# Patient Record
Sex: FEMALE | Race: WHITE | Employment: OTHER | URBAN - METROPOLITAN AREA
[De-identification: names, ages, dates, MRNs, and addresses within clinical notes are randomized per-mention and may not be internally consistent; named-entity substitution may affect disease eponyms.]

---

## 2022-07-19 RX ORDER — SODIUM CHLORIDE, SODIUM LACTATE, POTASSIUM CHLORIDE, CALCIUM CHLORIDE 600; 310; 30; 20 MG/100ML; MG/100ML; MG/100ML; MG/100ML
1000 INJECTION, SOLUTION INTRAVENOUS CONTINUOUS
Status: CANCELLED | OUTPATIENT
Start: 2022-07-19

## 2022-07-19 NOTE — DISCHARGE INSTRUCTIONS
Preoperative Instructions:    Stop eating solid foods at midnight the night prior to surgery. Stop drinking clear liquids at midnight the night prior to surgery. Follow bowel prep instructions as directed by surgeon. Arrive at the surgery center (Entrance B) by 6:00 on 8/3/2022  (or as directed by your surgeon's office). If you have been given a blood band, you must bring it with you the day of surgery. Please stop any blood thinning medications as directed by your surgeon or prescribing physician. Failure to stop certain medications may interfere with your scheduled surgery. These may include:  Aspirin, Warfarin (Coumadin), Clopidogrel (Plavix), Ibuprofen (Motrin, Advil), Naproxen (Aleve), Meloxicam (Mobic), Celecoxib (Celebrex), Eliquis, Pradaxa, Xarelto, Effient, Fish Oil, Herbal supplements. You may continue the rest of your medications through the night before surgery unless instructed otherwise. Please take only the following medication(s) the day of surgery with a small sip of water:  Lisinopril, coreg/carvedilol, norvasc/amlodipine, protonix    Please use and bring inhalers the day of surgery. Please bring CPAP the day of surgery. ____________________________  ____________________________  Signature (Patient)           Signature/date(Provider)      REMINDERS:  ** If you are going home the day of your procedure, you will need a friend or family member to drive you home after your procedure. Your  must be 25years of age or older and able to sign off on your discharge instructions. Taxi cabs or any form of public transportation is not acceptable. ** It is preferable that the friend or family member stay at the hospital throughout your procedure. ** If you are going home the same day as your procedure, someone must remain with you for the first 24 hours after your surgery if you receive anesthesia or sedation.   If you do not have someone to stay with you, your procedure may be cancelled. **  Please do not wear any jewelry or body piercings the day of surgery. PREPARING FOR YOUR SURGERY:     Before surgery, you can play an important role in your own health. Because skin is not sterile, we need to be sure that your skin is as free of germs as possible before surgery by carefully washing before surgery. Preparing or prepping skin before surgery can reduce the risk of a surgical site infection.   Do not shave the area of your body where your surgery will be performed unless you received specific permission from your physician. You will need to shower at home the night before surgery and the morning of surgery with a special soap called chlorhexidine gluconate (CHG*). *Not to be used by people allergic to Chlorhexidine Gluconate (CHG). Following these instructions will help you be sure that your skin is clean before surgery. Instructions on cleaning your skin before surgery: The night before your surgery: You will need to shower with warm water (not hot) and the CHG soap. Use a clean wash cloth and a clean towel. Have clean clothes available to put on after the shower. First wash your hair with regular shampoo. Rinse your hair and body thoroughly to remove the shampoo. Wash your face with your regular soap or water only. Thoroughly rinse your body with warm water from the neck down. Turn water off to prevent rinsing the soap off too soon. With a clean wet washcloth and half of the CHG soap in the bottle, lather your entire body from the neck down. Do not use CHG soap near your eyes or ears to avoid injury to those areas. Wash thoroughly, paying special attention to the area where your surgery will be performed. Wash your body gently for five (5) minutes. Avoid scrubbing your skin too hard. Turn the water back on and rinse your body thoroughly. Pat yourself dry with a clean, soft towel.  Do not apply lotion, cream or powder. Dress with clean freshly washed clothes. The morning of surgery:    Repeat shower following steps above  - using remaining half of CHG soap in bottle. If you have any questions, call the Pre-Admission Testing Unit at [phone]. Day of Surgery/Procedure    As a patient at [facility] you can expect quality medical and nursing care that is centered on your individual needs. Our goal is to make your surgical experience as comfortable as possible  . Directions to the [address] [place] [address] is located at [address] [facility], [address]. Please pull into the Emergency [facility] parking lot (Entrance B) and park in that lot. We also have additional parking across the street. You will enter the facility following the [address] sign. Please stop at the reception desk where you will be checked in by the staff. If you have any questions please call [phone]. Transportation after your procedure. You will need a friend or family member to drive you home after your procedure. Your  must be 25years of age or older and able to sign off on your discharge instructions. Taxi cabs or any form of public transportation is not acceptable. It is preferable that the friend or family member stay at the hospital throughout your procedure. Someone must remain at home with you for the first 24 hours after your surgery if you receive anesthesia or sedation. If you do not have someone to stay with you, your procedure may be cancelled. Patient Instructions    If you are having any type of anesthesia you are to have nothing to eat or drink after midnight the night before your surgery. This includes gum, mints, water or smoking or chewing tobacco.  The only exception to this is a small sip of water to take with any morning dose of heart, blood pressure, or seizure medications.     Bring a list of all medications you take, along with the dose of the medications and how often you take it. If more convenient bring the pharmacy bottles in a zip lock bag. Please shower the night before and the morning of surgery with an antibacterial soap. Please use the wipes given to you the night before your surgery after your shower. Unless otherwise told by your physician, please do not shave legs or any part of your body below your neck the night before or day of your surgery. You may shave your face or neck. Brush your teeth but do not swallow water. Bring your inhaler if you are currently using one. Bring your eyeglasses and case with you. No contacts are to be worn the day of surgery. You also may bring your hearing aids. Bring your blood band if one has been given to you. Please do not close the clasp. If you are on C-PAP or Bi-PAP at home and plan on staying in the hospital overnight for your surgery please bring the machine with you. Do not wear any jewelry or body piercings day of surgery. Also, NO lotion, perfume or deodorant to be used the day of surgery. Do not bring any valuables, such as jewelry, cash or credit cards. If you are staying overnight with us, please bring a SMALL bag of personal items. We cannot accommodate large items, like suitcases. Please wear loose, comfortable clothing. If you are potentially going to have a cast or brace bring clothing that will fit over them.                                                                                                                           In case of illness - If you have cold or flu like symptoms (high fever, runny nose, sore throat, cough, etc.) rash, nausea, vomiting, loose stools, and/or recent contact with someone who has a contagious disease (chicken pox, measles, etc.) Please call your doctor before coming to the hospital.      If your child is having surgery please make arrangements for any other children to be cared

## 2022-07-20 ENCOUNTER — HOSPITAL ENCOUNTER (OUTPATIENT)
Dept: PREADMISSION TESTING | Age: 77
Discharge: HOME | End: 2022-07-24
Payer: MEDICARE

## 2022-07-20 VITALS
WEIGHT: 147 LBS | TEMPERATURE: 97.6 F | OXYGEN SATURATION: 98 % | HEART RATE: 70 BPM | RESPIRATION RATE: 18 BRPM | DIASTOLIC BLOOD PRESSURE: 83 MMHG | HEIGHT: 64 IN | SYSTOLIC BLOOD PRESSURE: 160 MMHG | BODY MASS INDEX: 25.1 KG/M2

## 2022-07-20 LAB
ABO/RH: NORMAL
ANION GAP SERPL CALCULATED.3IONS-SCNC: 12 MMOL/L (ref 9–17)
ANTIBODY SCREEN: NEGATIVE
ARM BAND NUMBER: NORMAL
BILIRUBIN URINE: NEGATIVE
BUN BLDV-MCNC: 17 MG/DL (ref 8–23)
CHLORIDE BLD-SCNC: 104 MMOL/L (ref 98–107)
CO2: 26 MMOL/L (ref 20–31)
COLOR: YELLOW
COMMENT UA: ABNORMAL
CREAT SERPL-MCNC: 0.74 MG/DL (ref 0.5–0.9)
EXPIRATION DATE: NORMAL
GFR AFRICAN AMERICAN: >60 ML/MIN
GFR NON-AFRICAN AMERICAN: >60 ML/MIN
GFR SERPL CREATININE-BSD FRML MDRD: NORMAL ML/MIN/{1.73_M2}
GLUCOSE BLD-MCNC: 118 MG/DL (ref 70–99)
GLUCOSE URINE: NEGATIVE
HCT VFR BLD CALC: 42.1 % (ref 36.3–47.1)
HEMOGLOBIN: 13.9 G/DL (ref 11.9–15.1)
KETONES, URINE: NEGATIVE
LEUKOCYTE ESTERASE, URINE: NEGATIVE
MCH RBC QN AUTO: 29.8 PG (ref 25.2–33.5)
MCHC RBC AUTO-ENTMCNC: 33 G/DL (ref 28.4–34.8)
MCV RBC AUTO: 90.1 FL (ref 82.6–102.9)
NITRITE, URINE: NEGATIVE
NRBC AUTOMATED: 0 PER 100 WBC
PDW BLD-RTO: 13 % (ref 11.8–14.4)
PH UA: 7.5 (ref 5–8)
PLATELET # BLD: 320 K/UL (ref 138–453)
PMV BLD AUTO: 10.2 FL (ref 8.1–13.5)
POTASSIUM SERPL-SCNC: 4 MMOL/L (ref 3.7–5.3)
PROTEIN UA: NEGATIVE
RBC # BLD: 4.67 M/UL (ref 3.95–5.11)
SODIUM BLD-SCNC: 142 MMOL/L (ref 135–144)
SPECIFIC GRAVITY UA: 1 (ref 1–1.03)
TURBIDITY: CLEAR
URINE HGB: NEGATIVE
UROBILINOGEN, URINE: NORMAL
WBC # BLD: 9.1 K/UL (ref 3.5–11.3)

## 2022-07-20 PROCEDURE — 86900 BLOOD TYPING SEROLOGIC ABO: CPT

## 2022-07-20 PROCEDURE — 86901 BLOOD TYPING SEROLOGIC RH(D): CPT

## 2022-07-20 PROCEDURE — 86850 RBC ANTIBODY SCREEN: CPT

## 2022-07-20 PROCEDURE — 84520 ASSAY OF UREA NITROGEN: CPT

## 2022-07-20 PROCEDURE — 82565 ASSAY OF CREATININE: CPT

## 2022-07-20 PROCEDURE — 93005 ELECTROCARDIOGRAM TRACING: CPT | Performed by: ANESTHESIOLOGY

## 2022-07-20 PROCEDURE — 36415 COLL VENOUS BLD VENIPUNCTURE: CPT

## 2022-07-20 PROCEDURE — 81003 URINALYSIS AUTO W/O SCOPE: CPT

## 2022-07-20 PROCEDURE — 82947 ASSAY GLUCOSE BLOOD QUANT: CPT

## 2022-07-20 PROCEDURE — 80051 ELECTROLYTE PANEL: CPT

## 2022-07-20 PROCEDURE — 87086 URINE CULTURE/COLONY COUNT: CPT

## 2022-07-20 PROCEDURE — 85027 COMPLETE CBC AUTOMATED: CPT

## 2022-07-20 RX ORDER — PANTOPRAZOLE SODIUM 40 MG/1
40 TABLET, DELAYED RELEASE ORAL 2 TIMES DAILY
COMMUNITY
Start: 2021-12-13

## 2022-07-20 RX ORDER — HYDRALAZINE HYDROCHLORIDE 10 MG/1
10 TABLET, FILM COATED ORAL PRN
COMMUNITY
Start: 2022-03-15

## 2022-07-20 RX ORDER — AMLODIPINE BESYLATE 10 MG/1
10 TABLET ORAL DAILY
COMMUNITY

## 2022-07-20 RX ORDER — SIMVASTATIN 20 MG
20 TABLET ORAL NIGHTLY
COMMUNITY

## 2022-07-20 RX ORDER — L. ACIDOPHILUS/PECTIN, CITRUS 25MM-100MG
1 TABLET ORAL
COMMUNITY

## 2022-07-20 RX ORDER — LISINOPRIL 40 MG/1
40 TABLET ORAL
Status: ON HOLD | COMMUNITY
Start: 2022-03-15 | End: 2022-08-04 | Stop reason: HOSPADM

## 2022-07-20 RX ORDER — CARVEDILOL 12.5 MG/1
12.5 TABLET ORAL 2 TIMES DAILY WITH MEALS
COMMUNITY

## 2022-07-20 RX ORDER — BUTALBITAL, ACETAMINOPHEN AND CAFFEINE 300; 40; 50 MG/1; MG/1; MG/1
1 CAPSULE ORAL PRN
COMMUNITY
Start: 2022-03-15

## 2022-07-20 RX ORDER — MAGNESIUM OXIDE 400 MG/1
400 TABLET ORAL DAILY
COMMUNITY
Start: 2022-03-15

## 2022-07-20 RX ORDER — HEPARIN SODIUM 5000 [USP'U]/ML
5000 INJECTION, SOLUTION INTRAVENOUS; SUBCUTANEOUS ONCE
Status: CANCELLED | OUTPATIENT
Start: 2022-07-20 | End: 2022-07-20

## 2022-07-20 NOTE — PROGRESS NOTES
Anesthesia Focused Assessment    Has patient ever tested positive for COVID? No    STOP-BANG Sleep Apnea Questionnaire    SNORE loudly (heard through closed doors)? No  TIRED, fatigued, sleepy during daytime? No  OBSERVED stopping breathing during sleep? No  High blood PRESSURE being treated? Yes    BMI over 35? No  AGE over 48? Yes  NECK circumference over 16\"? No  GENDER (male)? No             Total 2  High risk 5-8  Intermediate risk 3-4  Low risk 0-2    Obstructive Sleep Apnea: denies  If YES, machine used: no     Type 1 DM:   no  T2DM:  no    Coronary Artery Disease:  no  Hypertension:  yes    Active smoker:  no  Drinks Alcohol:  no    Dentition: molar crowns    Defib / AICD / Pacemaker: no      Renal Failure/dialysis:  no    Patient was evaluated in PAT & anesthesia guidelines were applied. NPO guidelines, medication instructions and scheduled arrival time were reviewed with patient. I advised patient to please contact the surgeon's office, ahead of time if possible, if any new signs or symptoms of illness, infection, rash, etc    Hx of anesthesia complications:  no  Family hx of anesthesia complications:  dad had pneumonia complication after surgery in 1970s. Anesthesia contacted:   no  Medical or cardiac clearance ordered: patient says that PCP is aware of surgery, clearance pending. Will send results to pcp and request clearance for chart.     Aisha Cooper PA-C  7/20/22  11:02 AM

## 2022-07-20 NOTE — H&P
History and Physical    Pt Name: Dellia Severance  MRN: [id number]  YOB: 1945  Date of evaluation: 7/20/2022    SUBJECTIVE:   History of Chief Complaint:    Patient presents for PAT appointment. She reports renal mass. Patient says that she had a CT scan of the chest and was noted to have abnormality on imaging of the top of the kidneys. She says that she then underwent further imaging as a result, has has biopsy x 2. She now has been scheduled for left partial nephrectomy. Past Medical History    has a past medical history of Anxiety, Arthritis, Elevated blood sugar, GERD (gastroesophageal reflux disease), Hyperlipidemia, Hypertension, Migraine, Numbness, RBBB (right bundle branch block), Rectal bleeding, Renal cancer ([facility] [address] and Under care of service provider. Past Surgical History   has a past surgical history that includes Tonsillectomy and adenoidectomy (1960); Dilation and curettage of uterus (1975); Appendectomy (1989); Colonoscopy (1995); Hysterectomy (1997); back surgery (2008); fracture surgery (Left, 2008); lipoma resection (2015); and eye surgery (2016). Medications  Prior to Admission medications    Medication Sig Start Date End Date Taking? Authorizing Provider   lisinopril (PRINIVIL;ZESTRIL) 40 MG tablet Take 40 mg by mouth 3/15/22  Yes Historical Provider, MD   hydrALAZINE (APRESOLINE) 10 MG tablet Take 10 mg by mouth as needed 3/15/22  Yes Historical Provider, MD   butalbital-APAP-caffeine -40 MG CAPS per capsule Take 1 capsule by mouth as needed 3/15/22  Yes Historical Provider, MD   pantoprazole (PROTONIX) 40 MG tablet Take 40 mg by mouth in the morning and 40 mg before bedtime. 12/13/21  Yes Historical Provider, MD   magnesium oxide (MAG-OX) 400 MG tablet Take 400 mg by mouth in the morning.  3/15/22  Yes Historical Provider, MD   simvastatin (ZOCOR) 20 MG tablet Take 20 mg by mouth nightly 0.5 tab   Yes Historical Provider, MD   Multiple Vitamin (MVI, OPURITY BAND OPTIMIZED, CHEW TAB) Take 1 tablet by mouth in the morning. Yes Historical Provider, MD   Calcium Polycarbophil (FIBER-CAPS PO) Take 2 capsules by mouth daily   Yes Historical Provider, MD   carvedilol (COREG) 12.5 MG tablet Take 12.5 mg by mouth in the morning and 12.5 mg in the evening. Take with meals. Historical Provider, MD   amLODIPine (NORVASC) 10 MG tablet Take 10 mg by mouth in the morning. Historical Provider, MD   Cholecalciferol 50 MCG (2000 UT) TABS Take 2,000 Units by mouth in the morning. Historical Provider, MD   Lactobacillus Acid-Pectin (ACIDOPHILUS/CITRUS PECTIN) TABS Take 1 tablet by mouth daily (with breakfast)    Historical Provider, MD     Allergies  is allergic to latex, onion, adhesive tape, codeine, epoetin beta, erythromycin, oxycodone-acetaminophen, and propoxyphene. Family History  family history includes Heart Disease in her brother and brother; Lung Disease in her brother and brother; Other in her brother and sister; Pneumonia in her father and mother. Social History   reports that she has never smoked. She has never been exposed to tobacco smoke. She has never used smokeless tobacco.   reports that she does not currently use alcohol. reports no history of drug use.   Marital Status   Occupation none    Review of Systems:  CONSTITUTIONAL:   negative for fevers, chills, fatigue and malaise    EYES:   negative for double vision, blurred vision and photophobia    HEENT:   negative for tinnitus, epistaxis and sore throat     RESPIRATORY:   negative for cough, shortness of breath, wheezing     CARDIOVASCULAR:   negative for chest pain, palpitations, syncope, edema     GASTROINTESTINAL:   negative for nausea, vomiting     GENITOURINARY:   See HPI   MUSCULOSKELETAL:   negative for neck or back pain     NEUROLOGICAL:   Negative for weakness and tingling  negative for headaches and dizziness     PSYCHIATRIC:   negative for anxiety         OBJECTIVE:   VITALS:  height is 5' 4\" (1.626 m) and weight is 147 lb (66.7 kg). Her temporal temperature is 97.6 °F (36.4 °C). Her blood pressure is 160/83 (abnormal) and her pulse is 70. Her respiration is 18 and oxygen saturation is 98%. CONSTITUTIONAL:alert & cooperative, no acute distress. Pleasant and talkative. SKIN:  Warm and dry, no rashes on exposed areas of skin   HEAD:  Normocephalic, atraumatic   EYES: EOMs intact. EARS:  Hearing grossly WNL. NOSE:  Nares patent. No rhinorrhea   MOUTH/THROAT:  benign  NECK:good ROM   LUNGS: Clear to auscultation bilaterally, no wheezes. CARDIOVASCULAR: Heart sounds are normal.  Regular rate and rhythm without murmur. ABDOMEN: soft, non tender, non distended. EXTREMITIES: positive edema bilateral lower extremities. Testing:   EK22  Labs pending: drawn 2022     IMPRESSIONS:   Renal mass   has a past medical history of Anxiety, Arthritis, Elevated blood sugar, GERD (gastroesophageal reflux disease), Hyperlipidemia, Hypertension, Migraine, Numbness, RBBB (right bundle branch block), Rectal bleeding, Renal cancer ([facility] [address] and Under care of service provider (2022).    PLANS:   Partial nephrectomy    ALLEGRA Will PA-C  Electronically signed 2022 at 12:59 PM

## 2022-07-21 LAB
CULTURE: NORMAL
EKG ATRIAL RATE: 66 BPM
EKG P AXIS: 68 DEGREES
EKG P-R INTERVAL: 194 MS
EKG Q-T INTERVAL: 424 MS
EKG QRS DURATION: 122 MS
EKG QTC CALCULATION (BAZETT): 444 MS
EKG R AXIS: 43 DEGREES
EKG T AXIS: 23 DEGREES
EKG VENTRICULAR RATE: 66 BPM
SPECIMEN DESCRIPTION: NORMAL

## 2022-07-21 PROCEDURE — 93010 ELECTROCARDIOGRAM REPORT: CPT | Performed by: INTERNAL MEDICINE

## 2022-08-02 ENCOUNTER — ANESTHESIA EVENT (OUTPATIENT)
Dept: OPERATING ROOM | Age: 77
DRG: 657 | End: 2022-08-02
Payer: MEDICARE

## 2022-08-02 NOTE — H&P
H&P reviewed from 7/20/22 and patient seen and examined. There are no changes. Plan for OR for partial nephrectomy. Alyssa Hess MD  Urology Resident, PGY2  ______________________________________________________________________    History and Physical    Pt Name: Katherine Valerio  MRN: [id number]  YOB: 1945  Date of evaluation: 8/2/2022    SUBJECTIVE:   History of Chief Complaint:    Patient presents for PAT appointment. She reports renal mass. Patient says that she had a CT scan of the chest and was noted to have abnormality on imaging of the top of the kidneys. She says that she then underwent further imaging as a result, has has biopsy x 2. She now has been scheduled for left partial nephrectomy. Past Medical History    has a past medical history of Anxiety, Arthritis, Elevated blood sugar, GERD (gastroesophageal reflux disease), Hyperlipidemia, Hypertension, Migraine, Numbness, RBBB (right bundle branch block), Rectal bleeding, Renal cancer ([facility] [address] and Under care of service provider. Past Surgical History   has a past surgical history that includes Tonsillectomy and adenoidectomy (1960); Dilation and curettage of uterus (1975); Appendectomy (1989); Colonoscopy (1995); Hysterectomy (1997); back surgery (2008); fracture surgery (Left, 2008); lipoma resection (2015); and eye surgery (2016). Medications  Prior to Admission medications    Medication Sig Start Date End Date Taking? Authorizing Provider   lisinopril (PRINIVIL;ZESTRIL) 40 MG tablet Take 40 mg by mouth 3/15/22   Historical Provider, MD   hydrALAZINE (APRESOLINE) 10 MG tablet Take 10 mg by mouth as needed 3/15/22   Historical Provider, MD   carvedilol (COREG) 12.5 MG tablet Take 12.5 mg by mouth in the morning and 12.5 mg in the evening. Take with meals. Historical Provider, MD   amLODIPine (NORVASC) 10 MG tablet Take 10 mg by mouth in the morning.     Historical Provider, MD   butalbital-APAP-caffeine -40 MG CAPS per capsule Take 1 capsule by mouth as needed 3/15/22   Historical Provider, MD   pantoprazole (PROTONIX) 40 MG tablet Take 40 mg by mouth in the morning and 40 mg before bedtime. 12/13/21   Historical Provider, MD   Cholecalciferol 50 MCG (2000 UT) TABS Take 2,000 Units by mouth in the morning. Historical Provider, MD   magnesium oxide (MAG-OX) 400 MG tablet Take 400 mg by mouth in the morning. 3/15/22   Historical Provider, MD   Lactobacillus Acid-Pectin (ACIDOPHILUS/CITRUS PECTIN) TABS Take 1 tablet by mouth daily (with breakfast)    Historical Provider, MD   simvastatin (ZOCOR) 20 MG tablet Take 20 mg by mouth nightly 0.5 tab    Historical Provider, MD   Multiple Vitamin (MVI, OPURITY BAND OPTIMIZED, CHEW TAB) Take 1 tablet by mouth in the morning. Historical Provider, MD   Calcium Polycarbophil (FIBER-CAPS PO) Take 2 capsules by mouth daily    Historical Provider, MD     Allergies  is allergic to latex, onion, adhesive tape, codeine, epoetin beta, erythromycin, oxycodone-acetaminophen, and propoxyphene. Family History  family history includes Heart Disease in her brother and brother; Lung Disease in her brother and brother; Other in her brother and sister; Pneumonia in her father and mother. Social History   reports that she has never smoked. She has never been exposed to tobacco smoke. She has never used smokeless tobacco.   reports that she does not currently use alcohol. reports no history of drug use.   Marital Status   Occupation none    Review of Systems:  CONSTITUTIONAL:   negative for fevers, chills, fatigue and malaise    EYES:   negative for double vision, blurred vision and photophobia    HEENT:   negative for tinnitus, epistaxis and sore throat     RESPIRATORY:   negative for cough, shortness of breath, wheezing     CARDIOVASCULAR:   negative for chest pain, palpitations, syncope, edema     GASTROINTESTINAL:   negative for nausea, vomiting     GENITOURINARY:   See HPI   MUSCULOSKELETAL: negative for neck or back pain     NEUROLOGICAL:   Negative for weakness and tingling  negative for headaches and dizziness     PSYCHIATRIC:   negative for anxiety         OBJECTIVE:   VITALS:  vitals were not taken for this visit. CONSTITUTIONAL:alert & cooperative, no acute distress. Pleasant and talkative. SKIN:  Warm and dry, no rashes on exposed areas of skin   HEAD:  Normocephalic, atraumatic   EYES: EOMs intact. EARS:  Hearing grossly WNL. NOSE:  Nares patent. No rhinorrhea   MOUTH/THROAT:  benign  NECK:good ROM   LUNGS: Clear to auscultation bilaterally, no wheezes. CARDIOVASCULAR: Heart sounds are normal.  Regular rate and rhythm without murmur. ABDOMEN: soft, non tender, non distended. EXTREMITIES: positive edema bilateral lower extremities. Testing:   EK22  Labs pending: drawn 2022     IMPRESSIONS:   Renal mass   has a past medical history of Anxiety, Arthritis, Elevated blood sugar, GERD (gastroesophageal reflux disease), Hyperlipidemia, Hypertension, Migraine, Numbness, RBBB (right bundle branch block), Rectal bleeding, Renal cancer ([facility] [address] and Under care of service provider (2022).    PLANS:   Partial nephrectomy    Lidya De La Cruz MD  Electronically signed 2022 at 1:35 PM

## 2022-08-03 ENCOUNTER — HOSPITAL ENCOUNTER (INPATIENT)
Age: 77
LOS: 3 days | Discharge: HOME | DRG: 657 | End: 2022-08-06
Attending: UROLOGY | Admitting: UROLOGY
Payer: MEDICARE

## 2022-08-03 ENCOUNTER — ANESTHESIA (OUTPATIENT)
Dept: OPERATING ROOM | Age: 77
DRG: 657 | End: 2022-08-03
Payer: MEDICARE

## 2022-08-03 DIAGNOSIS — G89.18 ACUTE POSTOPERATIVE PAIN: Primary | ICD-10-CM

## 2022-08-03 DIAGNOSIS — C64.2 MALIGNANT NEOPLASM OF LEFT KIDNEY EXCLUDING RENAL PELVIS (HCC): ICD-10-CM

## 2022-08-03 PROBLEM — N28.89 RENAL MASS: Status: ACTIVE | Noted: 2022-08-03

## 2022-08-03 LAB
ANION GAP SERPL CALCULATED.3IONS-SCNC: 12 MMOL/L (ref 9–17)
BUN BLDV-MCNC: 13 MG/DL (ref 8–23)
CALCIUM SERPL-MCNC: 8.9 MG/DL (ref 8.6–10.4)
CHLORIDE BLD-SCNC: 101 MMOL/L (ref 98–107)
CO2: 25 MMOL/L (ref 20–31)
CREAT SERPL-MCNC: 1.05 MG/DL (ref 0.5–0.9)
GFR AFRICAN AMERICAN: >60 ML/MIN
GFR NON-AFRICAN AMERICAN: 51 ML/MIN
GFR SERPL CREATININE-BSD FRML MDRD: ABNORMAL ML/MIN/{1.73_M2}
GLUCOSE BLD-MCNC: 158 MG/DL (ref 70–99)
HCT VFR BLD CALC: 37.8 % (ref 36.3–47.1)
HEMOGLOBIN: 12.8 G/DL (ref 11.9–15.1)
MCH RBC QN AUTO: 30.2 PG (ref 25.2–33.5)
MCHC RBC AUTO-ENTMCNC: 33.9 G/DL (ref 28.4–34.8)
MCV RBC AUTO: 89.2 FL (ref 82.6–102.9)
NRBC AUTOMATED: 0 PER 100 WBC
PDW BLD-RTO: 12.8 % (ref 11.8–14.4)
PLATELET # BLD: 181 K/UL (ref 138–453)
PMV BLD AUTO: 10.6 FL (ref 8.1–13.5)
POC POTASSIUM: 4.6 MMOL/L (ref 3.5–4.5)
POTASSIUM SERPL-SCNC: 4.1 MMOL/L (ref 3.7–5.3)
RBC # BLD: 4.24 M/UL (ref 3.95–5.11)
SODIUM BLD-SCNC: 138 MMOL/L (ref 135–144)
WBC # BLD: 17.2 K/UL (ref 3.5–11.3)

## 2022-08-03 PROCEDURE — 6360000002 HC RX W HCPCS

## 2022-08-03 PROCEDURE — 6360000002 HC RX W HCPCS: Performed by: UROLOGY

## 2022-08-03 PROCEDURE — 6370000000 HC RX 637 (ALT 250 FOR IP): Performed by: PHYSICIAN ASSISTANT

## 2022-08-03 PROCEDURE — 6360000002 HC RX W HCPCS: Performed by: PHYSICIAN ASSISTANT

## 2022-08-03 PROCEDURE — 1200000000 HC SEMI PRIVATE

## 2022-08-03 PROCEDURE — 2580000003 HC RX 258: Performed by: PHYSICIAN ASSISTANT

## 2022-08-03 PROCEDURE — S2900 ROBOTIC SURGICAL SYSTEM: HCPCS | Performed by: UROLOGY

## 2022-08-03 PROCEDURE — 3700000000 HC ANESTHESIA ATTENDED CARE: Performed by: UROLOGY

## 2022-08-03 PROCEDURE — 2500000003 HC RX 250 WO HCPCS: Performed by: UROLOGY

## 2022-08-03 PROCEDURE — 7100000001 HC PACU RECOVERY - ADDTL 15 MIN: Performed by: UROLOGY

## 2022-08-03 PROCEDURE — 8E0W4CZ ROBOTIC ASSISTED PROCEDURE OF TRUNK REGION, PERCUTANEOUS ENDOSCOPIC APPROACH: ICD-10-PCS | Performed by: STUDENT IN AN ORGANIZED HEALTH CARE EDUCATION/TRAINING PROGRAM

## 2022-08-03 PROCEDURE — 7100000000 HC PACU RECOVERY - FIRST 15 MIN: Performed by: UROLOGY

## 2022-08-03 PROCEDURE — 36415 COLL VENOUS BLD VENIPUNCTURE: CPT

## 2022-08-03 PROCEDURE — 2500000003 HC RX 250 WO HCPCS: Performed by: PHYSICIAN ASSISTANT

## 2022-08-03 PROCEDURE — 88342 IMHCHEM/IMCYTCHM 1ST ANTB: CPT

## 2022-08-03 PROCEDURE — 85027 COMPLETE CBC AUTOMATED: CPT

## 2022-08-03 PROCEDURE — 80048 BASIC METABOLIC PNL TOTAL CA: CPT

## 2022-08-03 PROCEDURE — 2720000010 HC SURG SUPPLY STERILE: Performed by: UROLOGY

## 2022-08-03 PROCEDURE — 3600000009 HC SURGERY ROBOT BASE: Performed by: UROLOGY

## 2022-08-03 PROCEDURE — 6360000002 HC RX W HCPCS: Performed by: ANESTHESIOLOGY

## 2022-08-03 PROCEDURE — 3700000001 HC ADD 15 MINUTES (ANESTHESIA): Performed by: UROLOGY

## 2022-08-03 PROCEDURE — 0TT14ZZ RESECTION OF LEFT KIDNEY, PERCUTANEOUS ENDOSCOPIC APPROACH: ICD-10-PCS | Performed by: STUDENT IN AN ORGANIZED HEALTH CARE EDUCATION/TRAINING PROGRAM

## 2022-08-03 PROCEDURE — 2500000003 HC RX 250 WO HCPCS

## 2022-08-03 PROCEDURE — 84132 ASSAY OF SERUM POTASSIUM: CPT

## 2022-08-03 PROCEDURE — 2580000003 HC RX 258: Performed by: ANESTHESIOLOGY

## 2022-08-03 PROCEDURE — 88307 TISSUE EXAM BY PATHOLOGIST: CPT

## 2022-08-03 PROCEDURE — 87086 URINE CULTURE/COLONY COUNT: CPT

## 2022-08-03 PROCEDURE — 2709999900 HC NON-CHARGEABLE SUPPLY: Performed by: UROLOGY

## 2022-08-03 PROCEDURE — 3600000019 HC SURGERY ROBOT ADDTL 15MIN: Performed by: UROLOGY

## 2022-08-03 PROCEDURE — 88341 IMHCHEM/IMCYTCHM EA ADD ANTB: CPT

## 2022-08-03 RX ORDER — SODIUM CHLORIDE, SODIUM LACTATE, POTASSIUM CHLORIDE, CALCIUM CHLORIDE 600; 310; 30; 20 MG/100ML; MG/100ML; MG/100ML; MG/100ML
INJECTION, SOLUTION INTRAVENOUS CONTINUOUS
Status: DISCONTINUED | OUTPATIENT
Start: 2022-08-03 | End: 2022-08-03 | Stop reason: SDUPTHER

## 2022-08-03 RX ORDER — SODIUM CHLORIDE 0.9 % (FLUSH) 0.9 %
5-40 SYRINGE (ML) INJECTION PRN
Status: DISCONTINUED | OUTPATIENT
Start: 2022-08-03 | End: 2022-08-06 | Stop reason: HOSPADM

## 2022-08-03 RX ORDER — LANOLIN ALCOHOL/MO/W.PET/CERES
400 CREAM (GRAM) TOPICAL DAILY
Status: DISCONTINUED | OUTPATIENT
Start: 2022-08-03 | End: 2022-08-06 | Stop reason: HOSPADM

## 2022-08-03 RX ORDER — POLYETHYLENE GLYCOL 3350 17 G/17G
17 POWDER, FOR SOLUTION ORAL DAILY
Status: DISCONTINUED | OUTPATIENT
Start: 2022-08-03 | End: 2022-08-06 | Stop reason: HOSPADM

## 2022-08-03 RX ORDER — SODIUM CHLORIDE 9 MG/ML
INJECTION, SOLUTION INTRAVENOUS PRN
Status: DISCONTINUED | OUTPATIENT
Start: 2022-08-03 | End: 2022-08-06 | Stop reason: HOSPADM

## 2022-08-03 RX ORDER — HEPARIN SODIUM 5000 [USP'U]/ML
5000 INJECTION, SOLUTION INTRAVENOUS; SUBCUTANEOUS ONCE
Status: COMPLETED | OUTPATIENT
Start: 2022-08-03 | End: 2022-08-03

## 2022-08-03 RX ORDER — ROCURONIUM BROMIDE 10 MG/ML
INJECTION, SOLUTION INTRAVENOUS PRN
Status: DISCONTINUED | OUTPATIENT
Start: 2022-08-03 | End: 2022-08-03 | Stop reason: SDUPTHER

## 2022-08-03 RX ORDER — ONDANSETRON 4 MG/1
4 TABLET, ORALLY DISINTEGRATING ORAL EVERY 8 HOURS PRN
Status: DISCONTINUED | OUTPATIENT
Start: 2022-08-03 | End: 2022-08-06 | Stop reason: HOSPADM

## 2022-08-03 RX ORDER — LIDOCAINE HYDROCHLORIDE 10 MG/ML
INJECTION, SOLUTION EPIDURAL; INFILTRATION; INTRACAUDAL; PERINEURAL PRN
Status: DISCONTINUED | OUTPATIENT
Start: 2022-08-03 | End: 2022-08-03 | Stop reason: SDUPTHER

## 2022-08-03 RX ORDER — METOCLOPRAMIDE HYDROCHLORIDE 5 MG/ML
10 INJECTION INTRAMUSCULAR; INTRAVENOUS
Status: DISCONTINUED | OUTPATIENT
Start: 2022-08-03 | End: 2022-08-03 | Stop reason: HOSPADM

## 2022-08-03 RX ORDER — BUPIVACAINE HYDROCHLORIDE AND EPINEPHRINE 5; 5 MG/ML; UG/ML
INJECTION, SOLUTION PERINEURAL PRN
Status: DISCONTINUED | OUTPATIENT
Start: 2022-08-03 | End: 2022-08-03 | Stop reason: ALTCHOICE

## 2022-08-03 RX ORDER — SODIUM CHLORIDE 0.9 % (FLUSH) 0.9 %
5-40 SYRINGE (ML) INJECTION EVERY 12 HOURS SCHEDULED
Status: DISCONTINUED | OUTPATIENT
Start: 2022-08-03 | End: 2022-08-03 | Stop reason: HOSPADM

## 2022-08-03 RX ORDER — HYDRALAZINE HYDROCHLORIDE 10 MG/1
10 TABLET, FILM COATED ORAL EVERY 6 HOURS PRN
Status: DISCONTINUED | OUTPATIENT
Start: 2022-08-03 | End: 2022-08-06 | Stop reason: HOSPADM

## 2022-08-03 RX ORDER — AMLODIPINE BESYLATE 10 MG/1
10 TABLET ORAL DAILY
Status: DISCONTINUED | OUTPATIENT
Start: 2022-08-03 | End: 2022-08-06 | Stop reason: HOSPADM

## 2022-08-03 RX ORDER — TRAMADOL HYDROCHLORIDE 50 MG/1
50 TABLET ORAL EVERY 6 HOURS PRN
Status: DISCONTINUED | OUTPATIENT
Start: 2022-08-03 | End: 2022-08-06 | Stop reason: HOSPADM

## 2022-08-03 RX ORDER — SCOLOPAMINE TRANSDERMAL SYSTEM 1 MG/1
1 PATCH, EXTENDED RELEASE TRANSDERMAL
Status: DISCONTINUED | OUTPATIENT
Start: 2022-08-03 | End: 2022-08-06 | Stop reason: HOSPADM

## 2022-08-03 RX ORDER — ONDANSETRON 2 MG/ML
4 INJECTION INTRAMUSCULAR; INTRAVENOUS EVERY 6 HOURS PRN
Status: DISCONTINUED | OUTPATIENT
Start: 2022-08-03 | End: 2022-08-06 | Stop reason: HOSPADM

## 2022-08-03 RX ORDER — PROPOFOL 10 MG/ML
INJECTION, EMULSION INTRAVENOUS PRN
Status: DISCONTINUED | OUTPATIENT
Start: 2022-08-03 | End: 2022-08-03 | Stop reason: SDUPTHER

## 2022-08-03 RX ORDER — ACETAMINOPHEN 325 MG/1
650 TABLET ORAL EVERY 6 HOURS
Status: DISCONTINUED | OUTPATIENT
Start: 2022-08-03 | End: 2022-08-06 | Stop reason: HOSPADM

## 2022-08-03 RX ORDER — MIDAZOLAM HYDROCHLORIDE 2 MG/2ML
1 INJECTION, SOLUTION INTRAMUSCULAR; INTRAVENOUS EVERY 10 MIN PRN
Status: DISCONTINUED | OUTPATIENT
Start: 2022-08-03 | End: 2022-08-03 | Stop reason: HOSPADM

## 2022-08-03 RX ORDER — DROPERIDOL 2.5 MG/ML
0.62 INJECTION, SOLUTION INTRAMUSCULAR; INTRAVENOUS
Status: DISCONTINUED | OUTPATIENT
Start: 2022-08-03 | End: 2022-08-03 | Stop reason: HOSPADM

## 2022-08-03 RX ORDER — SODIUM CHLORIDE 9 MG/ML
INJECTION, SOLUTION INTRAVENOUS CONTINUOUS
Status: DISCONTINUED | OUTPATIENT
Start: 2022-08-03 | End: 2022-08-06 | Stop reason: HOSPADM

## 2022-08-03 RX ORDER — PANTOPRAZOLE SODIUM 40 MG/1
40 TABLET, DELAYED RELEASE ORAL 2 TIMES DAILY
Status: DISCONTINUED | OUTPATIENT
Start: 2022-08-03 | End: 2022-08-06 | Stop reason: HOSPADM

## 2022-08-03 RX ORDER — ONDANSETRON 2 MG/ML
INJECTION INTRAMUSCULAR; INTRAVENOUS
Status: COMPLETED
Start: 2022-08-03 | End: 2022-08-03

## 2022-08-03 RX ORDER — DIPHENHYDRAMINE HYDROCHLORIDE 50 MG/ML
25 INJECTION INTRAMUSCULAR; INTRAVENOUS EVERY 6 HOURS PRN
Status: DISCONTINUED | OUTPATIENT
Start: 2022-08-03 | End: 2022-08-06 | Stop reason: HOSPADM

## 2022-08-03 RX ORDER — BUTALBITAL, ACETAMINOPHEN AND CAFFEINE 50; 325; 40 MG/1; MG/1; MG/1
1 TABLET ORAL PRN
Status: DISCONTINUED | OUTPATIENT
Start: 2022-08-03 | End: 2022-08-06 | Stop reason: HOSPADM

## 2022-08-03 RX ORDER — SODIUM CHLORIDE 9 MG/ML
25 INJECTION, SOLUTION INTRAVENOUS PRN
Status: DISCONTINUED | OUTPATIENT
Start: 2022-08-03 | End: 2022-08-03 | Stop reason: HOSPADM

## 2022-08-03 RX ORDER — HYDRALAZINE HYDROCHLORIDE 20 MG/ML
10 INJECTION INTRAMUSCULAR; INTRAVENOUS
Status: DISCONTINUED | OUTPATIENT
Start: 2022-08-03 | End: 2022-08-03 | Stop reason: HOSPADM

## 2022-08-03 RX ORDER — SODIUM CHLORIDE 9 MG/ML
INJECTION, SOLUTION INTRAVENOUS PRN
Status: DISCONTINUED | OUTPATIENT
Start: 2022-08-03 | End: 2022-08-03 | Stop reason: HOSPADM

## 2022-08-03 RX ORDER — MINERAL OIL AND WHITE PETROLATUM 150; 830 MG/G; MG/G
OINTMENT OPHTHALMIC PRN
Status: DISCONTINUED | OUTPATIENT
Start: 2022-08-03 | End: 2022-08-06 | Stop reason: HOSPADM

## 2022-08-03 RX ORDER — PROMETHAZINE HYDROCHLORIDE 25 MG/ML
6.25 INJECTION, SOLUTION INTRAMUSCULAR; INTRAVENOUS EVERY 6 HOURS PRN
Status: DISCONTINUED | OUTPATIENT
Start: 2022-08-03 | End: 2022-08-06 | Stop reason: HOSPADM

## 2022-08-03 RX ORDER — ECHINACEA 400 MG
CAPSULE ORAL
COMMUNITY

## 2022-08-03 RX ORDER — DIPHENHYDRAMINE HYDROCHLORIDE 50 MG/ML
12.5 INJECTION INTRAMUSCULAR; INTRAVENOUS
Status: DISCONTINUED | OUTPATIENT
Start: 2022-08-03 | End: 2022-08-03 | Stop reason: HOSPADM

## 2022-08-03 RX ORDER — DEXAMETHASONE SODIUM PHOSPHATE 10 MG/ML
INJECTION INTRAMUSCULAR; INTRAVENOUS PRN
Status: DISCONTINUED | OUTPATIENT
Start: 2022-08-03 | End: 2022-08-03 | Stop reason: SDUPTHER

## 2022-08-03 RX ORDER — FENTANYL CITRATE 50 UG/ML
INJECTION, SOLUTION INTRAMUSCULAR; INTRAVENOUS PRN
Status: DISCONTINUED | OUTPATIENT
Start: 2022-08-03 | End: 2022-08-03 | Stop reason: SDUPTHER

## 2022-08-03 RX ORDER — SODIUM CHLORIDE 0.9 % (FLUSH) 0.9 %
5-40 SYRINGE (ML) INJECTION PRN
Status: DISCONTINUED | OUTPATIENT
Start: 2022-08-03 | End: 2022-08-03 | Stop reason: HOSPADM

## 2022-08-03 RX ORDER — SODIUM CHLORIDE, SODIUM LACTATE, POTASSIUM CHLORIDE, CALCIUM CHLORIDE 600; 310; 30; 20 MG/100ML; MG/100ML; MG/100ML; MG/100ML
1000 INJECTION, SOLUTION INTRAVENOUS CONTINUOUS
Status: DISCONTINUED | OUTPATIENT
Start: 2022-08-03 | End: 2022-08-03 | Stop reason: HOSPADM

## 2022-08-03 RX ORDER — ONDANSETRON 2 MG/ML
INJECTION INTRAMUSCULAR; INTRAVENOUS PRN
Status: DISCONTINUED | OUTPATIENT
Start: 2022-08-03 | End: 2022-08-03 | Stop reason: SDUPTHER

## 2022-08-03 RX ORDER — CHOLECALCIFEROL (VITAMIN D3) 125 MCG
5 CAPSULE ORAL NIGHTLY PRN
Status: DISCONTINUED | OUTPATIENT
Start: 2022-08-03 | End: 2022-08-06 | Stop reason: HOSPADM

## 2022-08-03 RX ORDER — SODIUM CHLORIDE 0.9 % (FLUSH) 0.9 %
5-40 SYRINGE (ML) INJECTION EVERY 12 HOURS SCHEDULED
Status: DISCONTINUED | OUTPATIENT
Start: 2022-08-03 | End: 2022-08-06 | Stop reason: HOSPADM

## 2022-08-03 RX ORDER — CARVEDILOL 12.5 MG/1
12.5 TABLET ORAL 2 TIMES DAILY WITH MEALS
Status: DISCONTINUED | OUTPATIENT
Start: 2022-08-03 | End: 2022-08-06 | Stop reason: HOSPADM

## 2022-08-03 RX ADMIN — PANTOPRAZOLE SODIUM 40 MG: 40 TABLET, DELAYED RELEASE ORAL at 21:02

## 2022-08-03 RX ADMIN — HYDROMORPHONE HYDROCHLORIDE 0.5 MG: 1 INJECTION, SOLUTION INTRAMUSCULAR; INTRAVENOUS; SUBCUTANEOUS at 11:54

## 2022-08-03 RX ADMIN — CARVEDILOL 12.5 MG: 12.5 TABLET, FILM COATED ORAL at 21:02

## 2022-08-03 RX ADMIN — SODIUM CHLORIDE: 9 INJECTION, SOLUTION INTRAVENOUS at 16:30

## 2022-08-03 RX ADMIN — FENTANYL CITRATE 50 MCG: 50 INJECTION, SOLUTION INTRAMUSCULAR; INTRAVENOUS at 08:58

## 2022-08-03 RX ADMIN — Medication 2000 MG: at 09:03

## 2022-08-03 RX ADMIN — ROCURONIUM BROMIDE 30 MG: 10 INJECTION, SOLUTION INTRAVENOUS at 09:11

## 2022-08-03 RX ADMIN — PROPOFOL 150 MG: 10 INJECTION, EMULSION INTRAVENOUS at 08:29

## 2022-08-03 RX ADMIN — HEPARIN SODIUM 5000 UNITS: 5000 INJECTION INTRAVENOUS; SUBCUTANEOUS at 07:37

## 2022-08-03 RX ADMIN — HYDROMORPHONE HYDROCHLORIDE 0.5 MG: 1 INJECTION, SOLUTION INTRAMUSCULAR; INTRAVENOUS; SUBCUTANEOUS at 20:14

## 2022-08-03 RX ADMIN — HYDROMORPHONE HYDROCHLORIDE 0.5 MG: 1 INJECTION, SOLUTION INTRAMUSCULAR; INTRAVENOUS; SUBCUTANEOUS at 12:11

## 2022-08-03 RX ADMIN — HYDROMORPHONE HYDROCHLORIDE 0.5 MG: 1 INJECTION, SOLUTION INTRAMUSCULAR; INTRAVENOUS; SUBCUTANEOUS at 11:45

## 2022-08-03 RX ADMIN — SODIUM CHLORIDE, POTASSIUM CHLORIDE, SODIUM LACTATE AND CALCIUM CHLORIDE: 600; 310; 30; 20 INJECTION, SOLUTION INTRAVENOUS at 08:36

## 2022-08-03 RX ADMIN — PROMETHAZINE HYDROCHLORIDE 6.25 MG: 25 INJECTION INTRAMUSCULAR; INTRAVENOUS at 20:16

## 2022-08-03 RX ADMIN — FENTANYL CITRATE 50 MCG: 50 INJECTION, SOLUTION INTRAMUSCULAR; INTRAVENOUS at 10:57

## 2022-08-03 RX ADMIN — ONDANSETRON 4 MG: 2 INJECTION INTRAMUSCULAR; INTRAVENOUS at 10:17

## 2022-08-03 RX ADMIN — ONDANSETRON 4 MG: 2 INJECTION INTRAMUSCULAR; INTRAVENOUS at 16:30

## 2022-08-03 RX ADMIN — HYDROMORPHONE HYDROCHLORIDE 0.5 MG: 1 INJECTION, SOLUTION INTRAMUSCULAR; INTRAVENOUS; SUBCUTANEOUS at 16:29

## 2022-08-03 RX ADMIN — HYDROMORPHONE HYDROCHLORIDE 0.5 MG: 1 INJECTION, SOLUTION INTRAMUSCULAR; INTRAVENOUS; SUBCUTANEOUS at 12:28

## 2022-08-03 RX ADMIN — ROCURONIUM BROMIDE 10 MG: 10 INJECTION, SOLUTION INTRAVENOUS at 09:45

## 2022-08-03 RX ADMIN — FENTANYL CITRATE 50 MCG: 50 INJECTION, SOLUTION INTRAMUSCULAR; INTRAVENOUS at 10:48

## 2022-08-03 RX ADMIN — FENTANYL CITRATE 50 MCG: 50 INJECTION, SOLUTION INTRAMUSCULAR; INTRAVENOUS at 08:29

## 2022-08-03 RX ADMIN — SODIUM CHLORIDE, POTASSIUM CHLORIDE, SODIUM LACTATE AND CALCIUM CHLORIDE 1000 ML: 600; 310; 30; 20 INJECTION, SOLUTION INTRAVENOUS at 07:45

## 2022-08-03 RX ADMIN — ROCURONIUM BROMIDE 10 MG: 10 INJECTION, SOLUTION INTRAVENOUS at 10:01

## 2022-08-03 RX ADMIN — PROPOFOL 60 MG: 10 INJECTION, EMULSION INTRAVENOUS at 09:12

## 2022-08-03 RX ADMIN — ROCURONIUM BROMIDE 10 MG: 10 INJECTION, SOLUTION INTRAVENOUS at 10:10

## 2022-08-03 RX ADMIN — ROCURONIUM BROMIDE 40 MG: 10 INJECTION, SOLUTION INTRAVENOUS at 08:30

## 2022-08-03 RX ADMIN — DEXAMETHASONE SODIUM PHOSPHATE 4 MG: 10 INJECTION INTRAMUSCULAR; INTRAVENOUS at 08:45

## 2022-08-03 RX ADMIN — LIDOCAINE HYDROCHLORIDE 50 MG: 10 INJECTION, SOLUTION EPIDURAL; INFILTRATION; INTRACAUDAL; PERINEURAL at 08:29

## 2022-08-03 RX ADMIN — SUGAMMADEX 200 MG: 100 INJECTION, SOLUTION INTRAVENOUS at 11:20

## 2022-08-03 RX ADMIN — SODIUM CHLORIDE, PRESERVATIVE FREE 10 ML: 5 INJECTION INTRAVENOUS at 20:55

## 2022-08-03 RX ADMIN — ACETAMINOPHEN 650 MG: 325 TABLET ORAL at 21:01

## 2022-08-03 RX ADMIN — Medication 2000 MG: at 17:18

## 2022-08-03 ASSESSMENT — PAIN DESCRIPTION - LOCATION
LOCATION: ABDOMEN;BACK
LOCATION: ABDOMEN
LOCATION: ABDOMEN;BACK
LOCATION: ABDOMEN

## 2022-08-03 ASSESSMENT — PAIN SCALES - GENERAL
PAINLEVEL_OUTOF10: 9
PAINLEVEL_OUTOF10: 8
PAINLEVEL_OUTOF10: 4
PAINLEVEL_OUTOF10: 4
PAINLEVEL_OUTOF10: 9
PAINLEVEL_OUTOF10: 9
PAINLEVEL_OUTOF10: 8
PAINLEVEL_OUTOF10: 10

## 2022-08-03 ASSESSMENT — PAIN DESCRIPTION - FREQUENCY: FREQUENCY: CONTINUOUS

## 2022-08-03 ASSESSMENT — PAIN DESCRIPTION - DESCRIPTORS
DESCRIPTORS: BURNING;CRAMPING
DESCRIPTORS: ACHING;DISCOMFORT
DESCRIPTORS: ACHING;DISCOMFORT

## 2022-08-03 ASSESSMENT — PAIN DESCRIPTION - ORIENTATION
ORIENTATION: LEFT;MID
ORIENTATION: MID;LEFT
ORIENTATION: LEFT

## 2022-08-03 ASSESSMENT — PAIN - FUNCTIONAL ASSESSMENT
PAIN_FUNCTIONAL_ASSESSMENT: PREVENTS OR INTERFERES SOME ACTIVE ACTIVITIES AND ADLS
PAIN_FUNCTIONAL_ASSESSMENT: 0-10
PAIN_FUNCTIONAL_ASSESSMENT: PREVENTS OR INTERFERES SOME ACTIVE ACTIVITIES AND ADLS

## 2022-08-03 ASSESSMENT — PAIN DESCRIPTION - PAIN TYPE: TYPE: SURGICAL PAIN

## 2022-08-03 ASSESSMENT — PAIN DESCRIPTION - ONSET: ONSET: AWAKENED FROM SLEEP

## 2022-08-03 ASSESSMENT — PAIN SCALES - WONG BAKER: WONGBAKER_NUMERICALRESPONSE: 2

## 2022-08-03 NOTE — ANESTHESIA PRE PROCEDURE
Department of Anesthesiology  Preprocedure Note       Name:  Jakub Balderrama   Age:  78 y.o.  :  1945                                          MRN:  [id number]         Date:  8/3/2022      Surgeon: Kiesha Boswell):  Sam Hernandez MD    Procedure: Procedure(s):  XI ROBOTIC LAPAROSCOPIC PARTIAL NEPHRECTOMY  (INTRA-OP LAP ULTRASOUND)  **SHORT STAY**    Medications prior to admission:   Prior to Admission medications    Medication Sig Start Date End Date Taking? Authorizing Provider   lisinopril (PRINIVIL;ZESTRIL) 40 MG tablet Take 40 mg by mouth 3/15/22   Historical Provider, MD   hydrALAZINE (APRESOLINE) 10 MG tablet Take 10 mg by mouth as needed 3/15/22   Historical Provider, MD   carvedilol (COREG) 12.5 MG tablet Take 12.5 mg by mouth in the morning and 12.5 mg in the evening. Take with meals. Historical Provider, MD   amLODIPine (NORVASC) 10 MG tablet Take 10 mg by mouth in the morning. Historical Provider, MD   butalbital-APAP-caffeine -40 MG CAPS per capsule Take 1 capsule by mouth as needed 3/15/22   Historical Provider, MD   pantoprazole (PROTONIX) 40 MG tablet Take 40 mg by mouth in the morning and 40 mg before bedtime. 21   Historical Provider, MD   Cholecalciferol 50 MCG (2000 UT) TABS Take 2,000 Units by mouth in the morning. Historical Provider, MD   magnesium oxide (MAG-OX) 400 MG tablet Take 400 mg by mouth in the morning. 3/15/22   Historical Provider, MD   Lactobacillus Acid-Pectin (ACIDOPHILUS/CITRUS PECTIN) TABS Take 1 tablet by mouth daily (with breakfast)    Historical Provider, MD   simvastatin (ZOCOR) 20 MG tablet Take 20 mg by mouth nightly 0.5 tab    Historical Provider, MD   Multiple Vitamin (MVI, OPURITY BAND OPTIMIZED, CHEW TAB) Take 1 tablet by mouth in the morning.     Historical Provider, MD   Calcium Polycarbophil (FIBER-CAPS PO) Take 2 capsules by mouth daily    Historical Provider, MD       Current medications:    No current facility-administered medications for this last liquid consumption: 08/02/22                        Date of last solid food consumption: 08/01/22    BMI:   Wt Readings from Last 3 Encounters:   07/20/22 147 lb (66.7 kg)     There is no height or weight on file to calculate BMI.    CBC:   Lab Results   Component Value Date/Time    WBC 9.1 07/20/2022 11:30 AM    RBC 4.67 07/20/2022 11:30 AM    HGB 13.9 07/20/2022 11:30 AM    HCT 42.1 07/20/2022 11:30 AM    MCV 90.1 07/20/2022 11:30 AM    RDW 13.0 07/20/2022 11:30 AM     07/20/2022 11:30 AM       CMP:   Lab Results   Component Value Date/Time     07/20/2022 11:30 AM    K 4.0 07/20/2022 11:30 AM     07/20/2022 11:30 AM    CO2 26 07/20/2022 11:30 AM    BUN 17 07/20/2022 11:30 AM    CREATININE 0.74 07/20/2022 11:30 AM    GFRAA >60 07/20/2022 11:30 AM    LABGLOM >60 07/20/2022 11:30 AM    GLUCOSE 118 07/20/2022 11:30 AM       POC Tests: No results for input(s): POCGLU, POCNA, POCK, POCCL, POCBUN, POCHEMO, POCHCT in the last 72 hours.     Coags: No results found for: PROTIME, INR, APTT    HCG (If Applicable): No results found for: PREGTESTUR, PREGSERUM, HCG, HCGQUANT     ABGs: No results found for: PHART, PO2ART, WUC6CAP, WUJ8DBQ, BEART, A7QXULYT     Type & Screen (If Applicable):  No results found for: LABABO, LABRH    Drug/Infectious Status (If Applicable):  No results found for: HIV, HEPCAB    COVID-19 Screening (If Applicable): No results found for: COVID19        Anesthesia Evaluation  Patient summary reviewed and Nursing notes reviewed no history of anesthetic complications:   Airway: Mallampati: III  TM distance: >3 FB   Neck ROM: limited  Mouth opening: > = 3 FB   Dental: normal exam         Pulmonary:Negative Pulmonary ROS and normal exam                               Cardiovascular:    (+) hypertension:,                   Neuro/Psych:   (+) headaches: migraine headaches,             GI/Hepatic/Renal:   (+) GERD:,           Endo/Other: Negative Endo/Other ROS                    Abdominal: Vascular: Other Findings:           Anesthesia Plan      general     ASA 2       Induction: intravenous. MIPS: Postoperative opioids intended and Prophylactic antiemetics administered. Anesthetic plan and risks discussed with patient. Use of blood products discussed with patient whom consented to blood products. Plan discussed with CRNA.                     Bernie Hatch MD   8/3/2022

## 2022-08-03 NOTE — OP NOTE
Operative Note      Patient: Lavada Prader  YOB: 1945  MRN: [id number]    Date of Procedure: 8/3/2022    Pre-Op Diagnosis: MALIGNANT NEOPLASM LEFT KIDNEY    Post-Op Diagnosis: Same       Procedure(s):  XI ROBOTIC LAPAROSCOPIC  LEFT RADICAL NEPHRECTOMY      Surgeon(s):  Michael Gardner MD    Assistant:   First Assistant: Hiren Quiñonez  Resident: Eldon Faith MD    Anesthesia: General    Estimated Blood Loss (mL): less than 898     Complications: None    Specimens:   ID Type Source Tests Collected by Time Destination   1 : URINE COLLECTED FROM MCGOVERN PLACED IN OR Urine Urine, indwelling catheter CULTURE, URINE Michael Gardner MD 8/3/2022 1039    A : LEFT KIDNEY  Tissue Tissue SURGICAL PATHOLOGY Michael Gardner MD 8/3/2022 9038        Implants:  * No implants in log *      Drains:   Urinary Catheter Mcgovern (Active)   Site Assessment Pink; No urethral drainage 08/03/22 1417   Urine Color Yellow 08/03/22 1417   Urine Appearance Clear 08/03/22 1417   Collection Container Standard 08/03/22 1417   Catheter Best Practices  Catheter secured to thigh; Bag below bladder 08/03/22 1417   Status Draining 08/03/22 1417   Output (mL) 200 mL 08/03/22 1215       Indications: Lavada Prader is a 78 y.o. female with a left side renal mass, 2.5 cm close to the hilum. All treatment options were discussed, including risk of conversion to radical nephrectomy. Risks, benefits, goals, alternatives, possible complications were discussed with patient. Patient elected for above mentioned procedures. Consent was signed. Patient elected to proceed. Details: Patient was brought back to the operating room. Laid in the supine position. EPC cuffs were placed on and functioning prior to anesthesia. Antibiotics were given. Mcgovern catheter was placed. General anesthesia was inducted. Patient was then placed in right side lateral decubitus position. Pressure points were padded. Axillary roll was placed.  Patient was then prepped, draped in usual sterile standard fashion. Veress needle was used to obtain pneumo-peritoneum successfully. We entered the intra-abdominal cavity under direct visualization. Robotic ports were placed under direct visualizations. The Robot was docked. The white line of Toldt was cut and the colon was reflected. This was carried down to valley, and the ureter and gonadal vessel were identified. The tail of gerota's was lifted off the psoas and the kidney was lifted. We approached the hilum and the artery and vein were dissected out completely. We then exposed the main reanl vein and artery to help with our hilar dissection. We made an incision on the Gerota's fascia and used the Lap ultrasound to visualize the mass. This was close to hilum. We dissected out gerota's fat and visualized the mass. We outlined the margins of dissection. Bulldog clamps were placed on the hilar vessels. We then began dissecting out a rim of parenchyma over the tumor to reach the tumor. Then we started dissecting the tumor and found that the tumor had extension into the main renal vein. At this point it was decided to convert to a radical nephrectomy. Hence we used a 60 mm vascular stapler to take the hilum in total. Then we began dissecting the superior pole and took this down with two staple loads. The lateral dissection followed and finally the ureter was clipped and divide. The surgical bed was inspected and it was hemostatic. The robot was undocked. The specimen was removed from th lower left robotic port sites by extending the incision laterally. The fascia was closed with continuous 0 PDS suture. The robotic ports were removed under direct visualization without evidence of bleeding. The skin was closed with 4-0 monocryl and reinforced with dermabond. Case was concluded and patient tolerated the procedure well. Anesthesia was reversed and patient was extubated, taken to recovery in stable conditon.  Patient was admitted to the floor for routine post op care. Dr. Holly Nails was present for all key portions of the case.      Yasmin GonzalesCobre [facility] Centeruan         Electronically signed by Carlos Alatorre MD on 8/3/2022 at 2:39 PM

## 2022-08-03 NOTE — ACP (ADVANCE CARE PLANNING)
[facility] Clinical Specialist  Conversation Note      Date of ACP Conversation: 8/3/2022    Conversation Conducted with: Patient with Decision Making Capacity    ACP Clinical Specialist: Jorden Ivan RN        Healthcare Decision Maker:     Current Designated Healthcare Decision Maker:     Click here to complete Healthcare Decision Makers including section of the Healthcare Decision Maker Relationship (ie \"Primary\")      Care Preferences    Hospitalization: \"If your health worsens and it becomes clear that your chance of recovery is unlikely, what would your preference be regarding hospitalization? \"    Choice:  [] The patient wants hospitalization. [] The patient prefers comfort-focused treatment without hospitalization. Ventilation: \"If you were in your present state of health and suddenly became very ill and were unable to breathe on your own, what would your preference be about the use of a ventilator (breathing machine) if it were available to you? \"      If the patient would desire the use of ventilator (breathing machine), answer \"yes\". If not, \"no\": yes    \"If your health worsens and it becomes clear that your chance of recovery is unlikely, what would your preference be about the use of a ventilator (breathing machine) if it were available to you? \"     Would the patient desire the use of ventilator (breathing machine)?: Yes      Resuscitation  \"CPR works best to restart the heart when there is a sudden event, like a heart attack, in someone who is otherwise healthy. Unfortunately, CPR does not typically restart the heart for people who have serious health conditions or who are very sick. \"    \"In the event your heart stopped as a result of an underlying serious health condition, would you want attempts to be made to restart your heart (answer \"yes\" for attempt to resuscitate) or would you prefer a natural death (answer \"no\" for do not attempt to resuscitate)? \" yes       [] Yes   [] No   Educated Patient / Valetta Cons regarding differences between Advance Directives and portable DNR orders.     Length of ACP Conversation in minutes:      Conversation Outcomes:  [] ACP discussion completed  [] Existing advance directive reviewed with patient; no changes to patient's previously recorded wishes  [] New Advance Directive completed  [] Portable Do Not Rescitate prepared for Provider review and signature  [] POLST/POST/MOLST/MOST prepared for Provider review and signature      Follow-up plan:    [] Schedule follow-up conversation to continue planning  [] Referred individual to Provider for additional questions/concerns   [] Advised patient/agent/surrogate to review completed ACP document and update if needed with changes in condition, patient preferences or care setting    [] This note routed to one or more involved healthcare providers

## 2022-08-03 NOTE — PLAN OF CARE
Problem: Discharge Planning  Goal: Discharge to home or other facility with appropriate resources  Outcome: Progressing  Flowsheets (Taken 8/3/2022 1410)  Discharge to home or other facility with appropriate resources: Identify barriers to discharge with patient and caregiver     Problem: Pain  Goal: Verbalizes/displays adequate comfort level or baseline comfort level  Outcome: Progressing     Problem: ABCDS Injury Assessment  Goal: Absence of physical injury  Outcome: Progressing

## 2022-08-03 NOTE — CARE COORDINATION
08/03/22 1649   Service Assessment   Patient Orientation Alert and Oriented   Cognition Alert   History Provided By Patient   Primary Caregiver Self   Accompanied By/Relationship    Support Systems Spouse/Significant Other   PCP Verified by CM Yes   Last Visit to PCP Within last 3 months   Prior Functional Level Independent in ADLs/IADLs   Current Functional Level Independent in ADLs/IADLs   Can patient return to prior living arrangement Yes   Ability to make needs known: Good   Family able to assist with home care needs: Yes   Would you like for me to discuss the discharge plan with any other family members/significant others, and if so, who? No   Social/Functional History   Lives With Spouse   Type of [facility] Two level   Home Access Stairs to enter with rails   Entrance Stairs - Number of Steps 2   Entrance Stairs - Rails Left   Bathroom Shower/Tub Tub/Shower unit   Bathroom Toilet Standard   Bathroom Equipment Grab bars in shower   ADL Assistance Independent   Homemaking Assistance Independent   Homemaking Responsibilities Yes   Ambulation Assistance Independent   Transfer Assistance Independent   Active  Yes   Mode of Transportation Car;Family   Occupation Retired   Discharge Planning   Current Services Prior To Admission None   DME Ordered? No   Potential Assistance Purchasing Medications No   Meds-to-Beds: Does the patient want to have any new prescriptions delivered to bedside prior to discharge? No   Type of Home Care Services None   Patient expects to be discharged to: House   One/Two Story Residence Two story, live on first floor   Interior Rails None   Lift Chair Available No   History of falls?  0   Services At/After Discharge   Confirm Follow Up Transport Family

## 2022-08-03 NOTE — PLAN OF CARE
Problem: Discharge Planning  Goal: Discharge to home or other facility with appropriate resources  8/3/2022 1801 by Yvonne Gamez RN  Outcome: Progressing  8/3/2022 1547 by Yvonne Gamez RN  Outcome: Progressing  Flowsheets (Taken 8/3/2022 1410)  Discharge to home or other facility with appropriate resources: Identify barriers to discharge with patient and caregiver     Problem: Pain  Goal: Verbalizes/displays adequate comfort level or baseline comfort level  8/3/2022 1801 by Yvonne Gamez RN  Outcome: Progressing  8/3/2022 1547 by Yvonne Gamez RN  Outcome: Progressing     Problem: ABCDS Injury Assessment  Goal: Absence of physical injury  8/3/2022 1801 by Yvonne Gamez RN  Outcome: Progressing  8/3/2022 1547 by Yvonne Gamez RN  Outcome: Progressing

## 2022-08-04 LAB
ANION GAP SERPL CALCULATED.3IONS-SCNC: 11 MMOL/L (ref 9–17)
BUN BLDV-MCNC: 14 MG/DL (ref 8–23)
CALCIUM SERPL-MCNC: 8 MG/DL (ref 8.6–10.4)
CHLORIDE BLD-SCNC: 102 MMOL/L (ref 98–107)
CO2: 24 MMOL/L (ref 20–31)
CREAT SERPL-MCNC: 1.15 MG/DL (ref 0.5–0.9)
CULTURE: NO GROWTH
GFR AFRICAN AMERICAN: 56 ML/MIN
GFR NON-AFRICAN AMERICAN: 46 ML/MIN
GFR SERPL CREATININE-BSD FRML MDRD: ABNORMAL ML/MIN/{1.73_M2}
GLUCOSE BLD-MCNC: 112 MG/DL (ref 70–99)
HCT VFR BLD CALC: 30.7 % (ref 36.3–47.1)
HCT VFR BLD CALC: 33.4 % (ref 36.3–47.1)
HEMOGLOBIN: 10.1 G/DL (ref 11.9–15.1)
HEMOGLOBIN: 10.4 G/DL (ref 11.9–15.1)
MCH RBC QN AUTO: 29.3 PG (ref 25.2–33.5)
MCH RBC QN AUTO: 30.1 PG (ref 25.2–33.5)
MCHC RBC AUTO-ENTMCNC: 31.1 G/DL (ref 28.4–34.8)
MCHC RBC AUTO-ENTMCNC: 32.9 G/DL (ref 28.4–34.8)
MCV RBC AUTO: 91.6 FL (ref 82.6–102.9)
MCV RBC AUTO: 94.1 FL (ref 82.6–102.9)
NRBC AUTOMATED: 0 PER 100 WBC
NRBC AUTOMATED: 0 PER 100 WBC
PDW BLD-RTO: 13.2 % (ref 11.8–14.4)
PDW BLD-RTO: 13.2 % (ref 11.8–14.4)
PLATELET # BLD: 208 K/UL (ref 138–453)
PLATELET # BLD: 219 K/UL (ref 138–453)
PMV BLD AUTO: 10.2 FL (ref 8.1–13.5)
PMV BLD AUTO: 10.5 FL (ref 8.1–13.5)
POTASSIUM SERPL-SCNC: 3.6 MMOL/L (ref 3.7–5.3)
RBC # BLD: 3.35 M/UL (ref 3.95–5.11)
RBC # BLD: 3.55 M/UL (ref 3.95–5.11)
SODIUM BLD-SCNC: 137 MMOL/L (ref 135–144)
SPECIMEN DESCRIPTION: NORMAL
WBC # BLD: 10.4 K/UL (ref 3.5–11.3)
WBC # BLD: 11.2 K/UL (ref 3.5–11.3)

## 2022-08-04 PROCEDURE — 2500000003 HC RX 250 WO HCPCS: Performed by: PHYSICIAN ASSISTANT

## 2022-08-04 PROCEDURE — 6370000000 HC RX 637 (ALT 250 FOR IP): Performed by: PHYSICIAN ASSISTANT

## 2022-08-04 PROCEDURE — 80048 BASIC METABOLIC PNL TOTAL CA: CPT

## 2022-08-04 PROCEDURE — 85027 COMPLETE CBC AUTOMATED: CPT

## 2022-08-04 PROCEDURE — 2580000003 HC RX 258: Performed by: PHYSICIAN ASSISTANT

## 2022-08-04 PROCEDURE — 6360000002 HC RX W HCPCS: Performed by: PHYSICIAN ASSISTANT

## 2022-08-04 PROCEDURE — 1200000000 HC SEMI PRIVATE

## 2022-08-04 PROCEDURE — 2580000003 HC RX 258: Performed by: STUDENT IN AN ORGANIZED HEALTH CARE EDUCATION/TRAINING PROGRAM

## 2022-08-04 PROCEDURE — 2700000000 HC OXYGEN THERAPY PER DAY

## 2022-08-04 PROCEDURE — 36415 COLL VENOUS BLD VENIPUNCTURE: CPT

## 2022-08-04 RX ORDER — DOCUSATE SODIUM 100 MG/1
100 CAPSULE, LIQUID FILLED ORAL 2 TIMES DAILY
Qty: 60 CAPSULE | Refills: 0 | Status: SHIPPED | OUTPATIENT
Start: 2022-08-04 | End: 2022-09-03

## 2022-08-04 RX ORDER — TRAMADOL HYDROCHLORIDE 50 MG/1
50 TABLET ORAL EVERY 6 HOURS PRN
Qty: 15 TABLET | Refills: 0 | Status: SHIPPED | OUTPATIENT
Start: 2022-08-04 | End: 2022-08-08

## 2022-08-04 RX ORDER — LISINOPRIL 20 MG/1
40 TABLET ORAL DAILY
Status: DISCONTINUED | OUTPATIENT
Start: 2022-08-04 | End: 2022-08-04

## 2022-08-04 RX ORDER — POLYETHYLENE GLYCOL 3350 17 G/17G
17 POWDER, FOR SOLUTION ORAL DAILY
Qty: 510 G | Refills: 0 | Status: SHIPPED | OUTPATIENT
Start: 2022-08-04 | End: 2022-09-03

## 2022-08-04 RX ORDER — LISINOPRIL 40 MG/1
40 TABLET ORAL DAILY
COMMUNITY

## 2022-08-04 RX ADMIN — AMLODIPINE BESYLATE 10 MG: 10 TABLET ORAL at 08:26

## 2022-08-04 RX ADMIN — Medication 2000 MG: at 08:26

## 2022-08-04 RX ADMIN — ACETAMINOPHEN 650 MG: 325 TABLET ORAL at 02:36

## 2022-08-04 RX ADMIN — BENZOCAINE AND MENTHOL 1 LOZENGE: 15; 3.6 LOZENGE ORAL at 09:20

## 2022-08-04 RX ADMIN — ACETAMINOPHEN 650 MG: 325 TABLET ORAL at 09:19

## 2022-08-04 RX ADMIN — ACETAMINOPHEN 650 MG: 325 TABLET ORAL at 14:05

## 2022-08-04 RX ADMIN — ACETAMINOPHEN 650 MG: 325 TABLET ORAL at 19:41

## 2022-08-04 RX ADMIN — SODIUM CHLORIDE: 9 INJECTION, SOLUTION INTRAVENOUS at 09:08

## 2022-08-04 RX ADMIN — PANTOPRAZOLE SODIUM 40 MG: 40 TABLET, DELAYED RELEASE ORAL at 08:25

## 2022-08-04 RX ADMIN — SODIUM CHLORIDE: 9 INJECTION, SOLUTION INTRAVENOUS at 00:46

## 2022-08-04 RX ADMIN — CARVEDILOL 12.5 MG: 12.5 TABLET, FILM COATED ORAL at 19:48

## 2022-08-04 RX ADMIN — CARVEDILOL 12.5 MG: 12.5 TABLET, FILM COATED ORAL at 08:26

## 2022-08-04 RX ADMIN — Medication 2000 MG: at 16:12

## 2022-08-04 RX ADMIN — TRAMADOL HYDROCHLORIDE 50 MG: 50 TABLET, COATED ORAL at 07:32

## 2022-08-04 RX ADMIN — SODIUM CHLORIDE: 9 INJECTION, SOLUTION INTRAVENOUS at 22:38

## 2022-08-04 RX ADMIN — Medication 2000 MG: at 00:46

## 2022-08-04 RX ADMIN — PANTOPRAZOLE SODIUM 40 MG: 40 TABLET, DELAYED RELEASE ORAL at 19:43

## 2022-08-04 RX ADMIN — MAGNESIUM GLUCONATE 500 MG ORAL TABLET 400 MG: 500 TABLET ORAL at 19:43

## 2022-08-04 ASSESSMENT — PAIN DESCRIPTION - ORIENTATION
ORIENTATION: LEFT

## 2022-08-04 ASSESSMENT — PAIN DESCRIPTION - LOCATION
LOCATION: ABDOMEN
LOCATION: ABDOMEN
LOCATION: ABDOMEN;OTHER (COMMENT)
LOCATION: ABDOMEN
LOCATION: ABDOMEN;BACK
LOCATION: ABDOMEN

## 2022-08-04 ASSESSMENT — PAIN SCALES - WONG BAKER: WONGBAKER_NUMERICALRESPONSE: 2

## 2022-08-04 ASSESSMENT — PAIN SCALES - GENERAL
PAINLEVEL_OUTOF10: 6
PAINLEVEL_OUTOF10: 4
PAINLEVEL_OUTOF10: 3
PAINLEVEL_OUTOF10: 8
PAINLEVEL_OUTOF10: 5
PAINLEVEL_OUTOF10: 8
PAINLEVEL_OUTOF10: 3
PAINLEVEL_OUTOF10: 6

## 2022-08-04 ASSESSMENT — PAIN DESCRIPTION - FREQUENCY
FREQUENCY: CONTINUOUS
FREQUENCY: CONTINUOUS

## 2022-08-04 ASSESSMENT — PAIN DESCRIPTION - PAIN TYPE
TYPE: ACUTE PAIN;SURGICAL PAIN
TYPE: ACUTE PAIN;SURGICAL PAIN

## 2022-08-04 ASSESSMENT — PAIN DESCRIPTION - DESCRIPTORS
DESCRIPTORS: SHARP;DULL;ACHING
DESCRIPTORS: ACHING
DESCRIPTORS: DULL;SHARP;ACHING
DESCRIPTORS: ACHING;DISCOMFORT
DESCRIPTORS: DULL;ACHING;SHARP
DESCRIPTORS: ACHING;DISCOMFORT
DESCRIPTORS: ACHING;DISCOMFORT
DESCRIPTORS: SHARP;DULL;ACHING
DESCRIPTORS: ACHING;DISCOMFORT

## 2022-08-04 ASSESSMENT — PAIN - FUNCTIONAL ASSESSMENT
PAIN_FUNCTIONAL_ASSESSMENT: PREVENTS OR INTERFERES SOME ACTIVE ACTIVITIES AND ADLS

## 2022-08-04 ASSESSMENT — PAIN DESCRIPTION - ONSET
ONSET: ON-GOING
ONSET: ON-GOING

## 2022-08-04 NOTE — PLAN OF CARE
Problem: Discharge Planning  Goal: Discharge to home or other facility with appropriate resources  Outcome: Progressing  Flowsheets (Taken 8/4/2022 1200)  Discharge to home or other facility with appropriate resources: Identify barriers to discharge with patient and caregiver     Problem: Pain  Goal: Verbalizes/displays adequate comfort level or baseline comfort level  Outcome: Progressing     Problem: ABCDS Injury Assessment  Goal: Absence of physical injury  Outcome: Progressing  Flowsheets (Taken 8/4/2022 0924)  Absence of Physical Injury: Implement safety measures based on patient assessment     Problem: Safety - Adult  Goal: Free from fall injury  Outcome: Progressing  Flowsheets (Taken 8/4/2022 0932)  Free From Fall Injury: Instruct family/caregiver on patient safety

## 2022-08-04 NOTE — PROGRESS NOTES
Urology Progress Note      Subjective: Jian Adame is a 78 y.o. female. His/Her current Diet is: ADULT DIET; Clear Liquid. Since the previous note, the patient reports the following:  No acute issues overnight. No fevers or chills. Vomited x2 yesterday  Pain 5-6/10  Ambulating. On 4L O2  Wants regular diet      Vitals and Labs:  Vitals:    08/03/22 2014 08/03/22 2044 08/04/22 0041 08/04/22 0500   BP:   125/67 114/65   Pulse:   85 75   Resp: 16 16 16 16   Temp:   98.2 °F (36.8 °C) 98.2 °F (36.8 °C)   TempSrc:   Oral Oral   SpO2:   94% 96%   Weight:       Height:       PF:         I/O last 3 completed shifts: In: 2318 [P.O.:100; I.V.:1625]  Out: 490 [Urine:440; Blood:50]    Recent Labs     08/03/22  1739   WBC 17.2*   HGB 12.8   HCT 37.8   MCV 89.2        Recent Labs     08/03/22  1739      K 4.1      CO2 25   BUN 13   CREATININE 1.05*       No results for input(s): COLORU, PHUR, LABCAST, WBCUA, RBCUA, MUCUS, TRICHOMONAS, YEAST, BACTERIA, CLARITYU, SPECGRAV, LEUKOCYTESUR, UROBILINOGEN, BILIRUBINUR, BLOODU in the last 72 hours. Invalid input(s): NITRATE, GLUCOSEUKETONESUAMORPHOUS    Physical Exam:  NAD  A/O x 3  RRR  No accessory muscles of inspiration  Abdomen soft, incisions C/D/I, some bruising, mildly distended and tympanic. No CVA tenderness. Luna in place. Clear yellow UOP. No calf pain. EPCs on. Machine turned on.     Impression: 79 yo female with  Patient Active Problem List   Diagnosis    Renal mass   POD1 from robotic left radical nephrectomy    Plan:  Regular diet  Maintain luna catheter  Pain control and antiemetics PRN  Bowel regimen  Wean oxygen  Incentive spirometry  Encourage ambulation      Suzanna March MD  6:27 AM 8/4/2022

## 2022-08-05 LAB
ANION GAP SERPL CALCULATED.3IONS-SCNC: 9 MMOL/L (ref 9–17)
BUN BLDV-MCNC: 12 MG/DL (ref 8–23)
CALCIUM SERPL-MCNC: 8.3 MG/DL (ref 8.6–10.4)
CHLORIDE BLD-SCNC: 109 MMOL/L (ref 98–107)
CO2: 25 MMOL/L (ref 20–31)
CREAT SERPL-MCNC: 1.22 MG/DL (ref 0.5–0.9)
GFR AFRICAN AMERICAN: 52 ML/MIN
GFR NON-AFRICAN AMERICAN: 43 ML/MIN
GFR SERPL CREATININE-BSD FRML MDRD: ABNORMAL ML/MIN/{1.73_M2}
GLUCOSE BLD-MCNC: 129 MG/DL (ref 70–99)
POTASSIUM SERPL-SCNC: 4 MMOL/L (ref 3.7–5.3)
SODIUM BLD-SCNC: 143 MMOL/L (ref 135–144)
SURGICAL PATHOLOGY REPORT: NORMAL

## 2022-08-05 PROCEDURE — 2580000003 HC RX 258: Performed by: PHYSICIAN ASSISTANT

## 2022-08-05 PROCEDURE — 6360000002 HC RX W HCPCS: Performed by: PHYSICIAN ASSISTANT

## 2022-08-05 PROCEDURE — 51798 US URINE CAPACITY MEASURE: CPT

## 2022-08-05 PROCEDURE — 80048 BASIC METABOLIC PNL TOTAL CA: CPT

## 2022-08-05 PROCEDURE — 1200000000 HC SEMI PRIVATE

## 2022-08-05 PROCEDURE — 2580000003 HC RX 258: Performed by: STUDENT IN AN ORGANIZED HEALTH CARE EDUCATION/TRAINING PROGRAM

## 2022-08-05 PROCEDURE — 6370000000 HC RX 637 (ALT 250 FOR IP): Performed by: PHYSICIAN ASSISTANT

## 2022-08-05 PROCEDURE — 36415 COLL VENOUS BLD VENIPUNCTURE: CPT

## 2022-08-05 RX ADMIN — PANTOPRAZOLE SODIUM 40 MG: 40 TABLET, DELAYED RELEASE ORAL at 09:59

## 2022-08-05 RX ADMIN — ACETAMINOPHEN 650 MG: 325 TABLET ORAL at 14:59

## 2022-08-05 RX ADMIN — SODIUM CHLORIDE, PRESERVATIVE FREE 10 ML: 5 INJECTION INTRAVENOUS at 10:01

## 2022-08-05 RX ADMIN — ACETAMINOPHEN 650 MG: 325 TABLET ORAL at 02:39

## 2022-08-05 RX ADMIN — BUTALBITAL, ACETAMINOPHEN AND CAFFEINE 1 TABLET: 50; 325; 40 TABLET ORAL at 19:36

## 2022-08-05 RX ADMIN — Medication 2000 MG: at 10:00

## 2022-08-05 RX ADMIN — ACETAMINOPHEN 650 MG: 325 TABLET ORAL at 09:59

## 2022-08-05 RX ADMIN — PANTOPRAZOLE SODIUM 40 MG: 40 TABLET, DELAYED RELEASE ORAL at 20:51

## 2022-08-05 RX ADMIN — Medication 2000 MG: at 00:36

## 2022-08-05 RX ADMIN — CARVEDILOL 12.5 MG: 12.5 TABLET, FILM COATED ORAL at 09:59

## 2022-08-05 RX ADMIN — AMLODIPINE BESYLATE 10 MG: 10 TABLET ORAL at 09:59

## 2022-08-05 RX ADMIN — CARVEDILOL 12.5 MG: 12.5 TABLET, FILM COATED ORAL at 20:51

## 2022-08-05 RX ADMIN — ONDANSETRON 4 MG: 2 INJECTION INTRAMUSCULAR; INTRAVENOUS at 10:40

## 2022-08-05 RX ADMIN — SODIUM CHLORIDE: 9 INJECTION, SOLUTION INTRAVENOUS at 10:22

## 2022-08-05 RX ADMIN — MAGNESIUM GLUCONATE 500 MG ORAL TABLET 400 MG: 500 TABLET ORAL at 20:51

## 2022-08-05 RX ADMIN — TRAMADOL HYDROCHLORIDE 50 MG: 50 TABLET, COATED ORAL at 14:59

## 2022-08-05 RX ADMIN — ACETAMINOPHEN 650 MG: 325 TABLET ORAL at 20:51

## 2022-08-05 ASSESSMENT — PAIN DESCRIPTION - LOCATION
LOCATION: ABDOMEN
LOCATION: ABDOMEN
LOCATION: HEAD
LOCATION: ABDOMEN

## 2022-08-05 ASSESSMENT — PAIN SCALES - GENERAL
PAINLEVEL_OUTOF10: 6
PAINLEVEL_OUTOF10: 6
PAINLEVEL_OUTOF10: 5
PAINLEVEL_OUTOF10: 5
PAINLEVEL_OUTOF10: 8
PAINLEVEL_OUTOF10: 2

## 2022-08-05 ASSESSMENT — PAIN DESCRIPTION - ORIENTATION
ORIENTATION: LEFT
ORIENTATION: LEFT
ORIENTATION: MID
ORIENTATION: LEFT

## 2022-08-05 ASSESSMENT — PAIN DESCRIPTION - DESCRIPTORS
DESCRIPTORS: ACHING
DESCRIPTORS: ACHING;SHARP
DESCRIPTORS: ACHING

## 2022-08-05 ASSESSMENT — PAIN DESCRIPTION - PAIN TYPE: TYPE: ACUTE PAIN

## 2022-08-05 ASSESSMENT — PAIN - FUNCTIONAL ASSESSMENT
PAIN_FUNCTIONAL_ASSESSMENT: PREVENTS OR INTERFERES SOME ACTIVE ACTIVITIES AND ADLS
PAIN_FUNCTIONAL_ASSESSMENT: ACTIVITIES ARE NOT PREVENTED

## 2022-08-05 ASSESSMENT — PAIN DESCRIPTION - ONSET: ONSET: ON-GOING

## 2022-08-05 ASSESSMENT — PAIN DESCRIPTION - FREQUENCY: FREQUENCY: INTERMITTENT

## 2022-08-05 NOTE — PLAN OF CARE
Problem: Discharge Planning  Goal: Discharge to home or other facility with appropriate resources  Outcome: Progressing  Flowsheets (Taken 8/4/2022 1654 by Elvia Johansen RN)  Discharge to home or other facility with appropriate resources: Identify barriers to discharge with patient and caregiver     Problem: Pain  Goal: Verbalizes/displays adequate comfort level or baseline comfort level  Outcome: Progressing     Problem: ABCDS Injury Assessment  Goal: Absence of physical injury  8/5/2022 0517 by Drea Javed RN  Outcome: Progressing  8/5/2022 0517 by Drea Javed RN  Outcome: Progressing  8/5/2022 0512 by Drea Javed RN  Outcome: Progressing     Problem: Safety - Adult  Goal: Free from fall injury  8/5/2022 0517 by Drea Javed RN  Outcome: Progressing  8/5/2022 0517 by Drea Javed RN  Outcome: Progressing  8/5/2022 0512 by Drea Javed RN  Outcome: Progressing     Problem: Skin/Tissue Integrity - Adult  Goal: Incisions, wounds, or drain sites healing without S/S of infection  8/5/2022 0517 by Drea Javed RN  Outcome: Progressing  8/5/2022 0517 by Drea Javed RN  Outcome: Progressing     Problem: Genitourinary - Adult  Goal: Urinary catheter remains patent  Outcome: Progressing

## 2022-08-05 NOTE — PROGRESS NOTES
Urology Progress Note      Subjective: Nico Glez is a 78 y.o. female. His/Her current Diet is: ADULT DIET; Regular. Since the previous note, the patient reports the following:  No acute issues overnight. No fevers or chills. A little nausea yesterday, no vomiting  Pain improved  Ambulating. On 2L O2  Tolerating regular diet  Passing flatus      Vitals and Labs:  Vitals:    08/04/22 1632 08/04/22 1940 08/05/22 0049 08/05/22 0400   BP: 105/64 (!) 121/55 119/60 132/65   Pulse: 78 81 88 84   Resp: 18 18 17 16   Temp: 98.2 °F (36.8 °C) 99 °F (37.2 °C) 98.5 °F (36.9 °C) 98.2 °F (36.8 °C)   TempSrc: Oral Oral Oral Oral   SpO2: 91% 96% 95% 95%   Weight:       Height:       PF:         I/O last 3 completed shifts: In: 0164 [P.O.:1460; I.V.:2825]  Out: 2260 [Urine:2210; Blood:50]    Recent Labs     08/03/22  1739 08/04/22  0607 08/04/22  1144   WBC 17.2* 10.4 11.2   HGB 12.8 10.1* 10.4*   HCT 37.8 30.7* 33.4*   MCV 89.2 91.6 94.1    208 219     Recent Labs     08/03/22  1739 08/04/22  0607 08/05/22  0352    137 143   K 4.1 3.6* 4.0    102 109*   CO2 25 24 25   BUN 13 14 12   CREATININE 1.05* 1.15* 1.22*       No results for input(s): COLORU, PHUR, LABCAST, WBCUA, RBCUA, MUCUS, TRICHOMONAS, YEAST, BACTERIA, CLARITYU, SPECGRAV, LEUKOCYTESUR, UROBILINOGEN, BILIRUBINUR, BLOODU in the last 72 hours. Invalid input(s): NITRATE, GLUCOSEUKETONESUAMORPHOUS    Physical Exam:  NAD  A/O x 3  RRR  No accessory muscles of inspiration  Abdomen soft, incisions C/D/I, some bruising, mildly distended and tympanic, improved from yesterday. No CVA tenderness. [place] in place. Clear yellow UOP. No calf pain. EPCs on. Machine turned on.     Impression: 77 yo female with  Patient Active Problem List   Diagnosis    Renal mass   POD2 from robotic left radical nephrectomy    Plan:  Regular diet  Follow up voiding trial  Pain control and antiemetics PRN  Bowel regimen  Wean oxygen  Incentive spirometry  Encourage ambulation  Hopeful discharge today if oxygen is weaned      Luis Floyd MD  5:56 AM 8/5/2022

## 2022-08-05 NOTE — PLAN OF CARE
Problem: Discharge Planning  Goal: Discharge to home or other facility with appropriate resources  8/5/2022 1650 by Scott Gomez RN  Outcome: Progressing  8/5/2022 0517 by Lucretia Baptiste RN  Outcome: Progressing  8/5/2022 0517 by Lucretia Baptiste RN  Outcome: Progressing  8/5/2022 0512 by Lucretia Baptiste RN  Outcome: Progressing  Flowsheets (Taken 8/4/2022 1654 by Tyron Whalen RN)  Discharge to home or other facility with appropriate resources: Identify barriers to discharge with patient and caregiver     Problem: Pain  Goal: Verbalizes/displays adequate comfort level or baseline comfort level  8/5/2022 1650 by Scott Gomez RN  Outcome: Progressing  8/5/2022 0517 by Lucretia Baptiste RN  Outcome: Progressing  8/5/2022 0517 by Lucretia Baptiste RN  Outcome: Progressing  8/5/2022 0512 by Lucretia Baptiste RN  Outcome: Progressing     Problem: ABCDS Injury Assessment  Goal: Absence of physical injury  8/5/2022 1650 by Scott Gomez RN  Outcome: Progressing  8/5/2022 0517 by Lucretia Baptiste RN  Outcome: Progressing  8/5/2022 0517 by Lucretia Baptiste RN  Outcome: Progressing  8/5/2022 0512 by Lucretia Baptiste RN  Outcome: Progressing     Problem: Safety - Adult  Goal: Free from fall injury  8/5/2022 1650 by Scott Gomez RN  Outcome: Progressing  8/5/2022 0517 by Lucretia Baptiste RN  Outcome: Progressing  8/5/2022 0517 by Lucretia Baptiste RN  Outcome: Progressing  8/5/2022 0512 by Lucretia Baptiste RN  Outcome: Progressing     Problem: Skin/Tissue Integrity - Adult  Goal: Incisions, wounds, or drain sites healing without S/S of infection  8/5/2022 1650 by Scott Gomez RN  Outcome: Progressing  8/5/2022 0517 by Lucretia Baptiste RN  Outcome: Progressing  8/5/2022 0517 by Lucretia Baptiste RN  Outcome: Progressing     Problem: Genitourinary - Adult  Goal: Urinary catheter remains patent  8/5/2022 1650 by Scott Gomez RN  Outcome: Progressing  8/5/2022 0517 by Oh Beltran RN  Outcome: Progressing

## 2022-08-06 VITALS
SYSTOLIC BLOOD PRESSURE: 118 MMHG | RESPIRATION RATE: 16 BRPM | DIASTOLIC BLOOD PRESSURE: 66 MMHG | BODY MASS INDEX: 24.31 KG/M2 | TEMPERATURE: 98.1 F | HEART RATE: 73 BPM | HEIGHT: 64 IN | WEIGHT: 142.42 LBS | OXYGEN SATURATION: 92 %

## 2022-08-06 PROBLEM — C64.2 RENAL CANCER, LEFT (HCC): Status: ACTIVE | Noted: 2022-08-03

## 2022-08-06 LAB
ANION GAP SERPL CALCULATED.3IONS-SCNC: 9 MMOL/L (ref 9–17)
BUN BLDV-MCNC: 9 MG/DL (ref 8–23)
CALCIUM SERPL-MCNC: 8.7 MG/DL (ref 8.6–10.4)
CHLORIDE BLD-SCNC: 103 MMOL/L (ref 98–107)
CO2: 26 MMOL/L (ref 20–31)
CREAT SERPL-MCNC: 0.99 MG/DL (ref 0.5–0.9)
GFR AFRICAN AMERICAN: >60 ML/MIN
GFR NON-AFRICAN AMERICAN: 55 ML/MIN
GFR SERPL CREATININE-BSD FRML MDRD: ABNORMAL ML/MIN/{1.73_M2}
GLUCOSE BLD-MCNC: 163 MG/DL (ref 70–99)
HCT VFR BLD CALC: 35.4 % (ref 36.3–47.1)
HEMOGLOBIN: 11.6 G/DL (ref 11.9–15.1)
MAGNESIUM: 1.9 MG/DL (ref 1.6–2.6)
MCH RBC QN AUTO: 30.1 PG (ref 25.2–33.5)
MCHC RBC AUTO-ENTMCNC: 32.8 G/DL (ref 28.4–34.8)
MCV RBC AUTO: 91.7 FL (ref 82.6–102.9)
NRBC AUTOMATED: 0 PER 100 WBC
PDW BLD-RTO: 13 % (ref 11.8–14.4)
PLATELET # BLD: 225 K/UL (ref 138–453)
PMV BLD AUTO: 10.5 FL (ref 8.1–13.5)
POTASSIUM SERPL-SCNC: 3.1 MMOL/L (ref 3.7–5.3)
RBC # BLD: 3.86 M/UL (ref 3.95–5.11)
SODIUM BLD-SCNC: 138 MMOL/L (ref 135–144)
WBC # BLD: 8.5 K/UL (ref 3.5–11.3)

## 2022-08-06 PROCEDURE — 80048 BASIC METABOLIC PNL TOTAL CA: CPT

## 2022-08-06 PROCEDURE — 6370000000 HC RX 637 (ALT 250 FOR IP): Performed by: STUDENT IN AN ORGANIZED HEALTH CARE EDUCATION/TRAINING PROGRAM

## 2022-08-06 PROCEDURE — 83735 ASSAY OF MAGNESIUM: CPT

## 2022-08-06 PROCEDURE — 51798 US URINE CAPACITY MEASURE: CPT

## 2022-08-06 PROCEDURE — 36415 COLL VENOUS BLD VENIPUNCTURE: CPT

## 2022-08-06 PROCEDURE — 2580000003 HC RX 258: Performed by: PHYSICIAN ASSISTANT

## 2022-08-06 PROCEDURE — 85027 COMPLETE CBC AUTOMATED: CPT

## 2022-08-06 PROCEDURE — 6370000000 HC RX 637 (ALT 250 FOR IP): Performed by: PHYSICIAN ASSISTANT

## 2022-08-06 RX ORDER — POTASSIUM CHLORIDE 20 MEQ/1
40 TABLET, EXTENDED RELEASE ORAL PRN
Status: DISCONTINUED | OUTPATIENT
Start: 2022-08-06 | End: 2022-08-06 | Stop reason: HOSPADM

## 2022-08-06 RX ORDER — POTASSIUM CHLORIDE 7.45 MG/ML
20 INJECTION INTRAVENOUS ONCE
Status: DISCONTINUED | OUTPATIENT
Start: 2022-08-06 | End: 2022-08-06

## 2022-08-06 RX ORDER — POTASSIUM CHLORIDE 7.45 MG/ML
10 INJECTION INTRAVENOUS PRN
Status: DISCONTINUED | OUTPATIENT
Start: 2022-08-06 | End: 2022-08-06 | Stop reason: HOSPADM

## 2022-08-06 RX ADMIN — ACETAMINOPHEN 650 MG: 325 TABLET ORAL at 03:05

## 2022-08-06 RX ADMIN — BUTALBITAL, ACETAMINOPHEN AND CAFFEINE 1 TABLET: 50; 325; 40 TABLET ORAL at 08:38

## 2022-08-06 RX ADMIN — SODIUM CHLORIDE, PRESERVATIVE FREE 10 ML: 5 INJECTION INTRAVENOUS at 08:39

## 2022-08-06 RX ADMIN — POTASSIUM CHLORIDE 40 MEQ: 1500 TABLET, EXTENDED RELEASE ORAL at 12:13

## 2022-08-06 RX ADMIN — CARVEDILOL 12.5 MG: 12.5 TABLET, FILM COATED ORAL at 08:38

## 2022-08-06 RX ADMIN — PANTOPRAZOLE SODIUM 40 MG: 40 TABLET, DELAYED RELEASE ORAL at 08:37

## 2022-08-06 RX ADMIN — AMLODIPINE BESYLATE 10 MG: 10 TABLET ORAL at 08:38

## 2022-08-06 ASSESSMENT — PAIN SCALES - GENERAL: PAINLEVEL_OUTOF10: 3

## 2022-08-06 NOTE — PROGRESS NOTES
Urology Progress Note      Subjective: Ernesto Magana is a 78 y.o. female. His/Her current Diet is: ADULT DIET; Regular. Since the previous note, the patient reports the following:  No acute issues overnight. No fevers or chills. A little nausea yesterday and vomited, on clear liquid diet  Pain improved  Ambulating   Off oxygen   Passing flatus      Vitals and Labs:  Vitals:    08/05/22 1936 08/06/22 0000 08/06/22 0400 08/06/22 0858   BP: (!) 146/75 (!) 116/57 132/71 138/75   Pulse: 89 80 78 70   Resp: 16 18 18 16   Temp: 98.2 °F (36.8 °C) 98.4 °F (36.9 °C) 98 °F (36.7 °C) 98.2 °F (36.8 °C)   TempSrc: Oral Oral Oral    SpO2: 93% 93% 93% 94%   Weight:       Height:       PF:         I/O last 3 completed shifts:  In: -   Out: 5630 [Urine:5630]    Recent Labs     08/03/22 1739 08/04/22  0607 08/04/22  1144   WBC 17.2* 10.4 11.2   HGB 12.8 10.1* 10.4*   HCT 37.8 30.7* 33.4*   MCV 89.2 91.6 94.1    208 219       Recent Labs     08/03/22 1739 08/04/22  0607 08/05/22  0352    137 143   K 4.1 3.6* 4.0    102 109*   CO2 25 24 25   BUN 13 14 12   CREATININE 1.05* 1.15* 1.22*         No results for input(s): COLORU, PHUR, LABCAST, WBCUA, RBCUA, MUCUS, TRICHOMONAS, YEAST, BACTERIA, CLARITYU, SPECGRAV, LEUKOCYTESUR, UROBILINOGEN, BILIRUBINUR, BLOODU in the last 72 hours. Invalid input(s): NITRATE, GLUCOSEUKETONESUAMORPHOUS    Physical Exam:  NAD  A/O x 3  RRR  No accessory muscles of inspiration  Abdomen soft, incisions C/D/I, some bruising, mildly distended and tympanic, improved from yesterday. No CVA tenderness. No calf pain. EPCs on. Machine turned on.     Impression: 77 yo female with  Patient Active Problem List   Diagnosis    Renal mass   POD3 from robotic left radical nephrectomy    Plan:  Advance to Regular diet  Passed void trial   Pain control and antiemetics PRN  Bowel regimen  Wean oxygen  Incentive spirometry  Encourage ambulation  Hopeful discharge today if tolerating regular judith Gonzalez MD  10:08 AM 8/6/2022    I have reviewed the history above and agree. I have repeated the key portions of the physical exam and concur with the resident's findings. I have reviewed all laboratory findings and imaging reports/films. I agree with the plan as noted above.     Electronically signed by Ben Belcher MD on 8/6/2022 at 10:58 AM

## 2022-08-06 NOTE — DISCHARGE INSTRUCTIONS
Nephrectomy Discharge Instructions  Patient ok to discharge home in good condition  No heavy lifting, >10 lbs for 6-8 weeks  Patient should walk moderately at home  Patient ok to shower   Patient may resume diet as tolerated  Patient should take prescriptions as directed  No driving while on narcotics (tramadol, norco, percocet, oxycodone)  Please call attending physician or hospital  with questions  Call or Present to ED if fever (> 101F), intractable nausea,vomiting, or pain.   Prescriptions in chart    Patient should follow up with Dr. Marisel Coker, in 1 weeks, call to confirm appointment

## 2022-08-06 NOTE — PLAN OF CARE
Problem: Discharge Planning  Goal: Discharge to home or other facility with appropriate resources  8/6/2022 0207 by Pauline Kwan RN  Outcome: Progressing  8/5/2022 1650 by Sam Denise RN  Outcome: Progressing     Problem: Pain  Goal: Verbalizes/displays adequate comfort level or baseline comfort level  8/6/2022 0207 by Pauline Kwan RN  Outcome: Progressing  8/5/2022 1650 by Sam Denise RN  Outcome: Progressing     Problem: ABCDS Injury Assessment  Goal: Absence of physical injury  8/6/2022 0207 by Pauline Kwan RN  Outcome: Progressing  8/5/2022 1650 by Sam Denise RN  Outcome: Progressing     Problem: Safety - Adult  Goal: Free from fall injury  8/6/2022 0207 by Pauline Kwan RN  Outcome: Progressing  8/5/2022 1650 by Sam Denise RN  Outcome: Progressing     Problem: Skin/Tissue Integrity - Adult  Goal: Incisions, wounds, or drain sites healing without S/S of infection  8/6/2022 0207 by Pauline Kwan RN  Outcome: Progressing  8/5/2022 1650 by Sam Denise RN  Outcome: Progressing     Problem: Genitourinary - Adult  Goal: Urinary catheter remains patent  8/6/2022 0207 by Pauline Kwan RN  Outcome: Progressing  8/5/2022 1650 by Sam Denise RN  Outcome: Progressing

## 2022-08-06 NOTE — DISCHARGE SUMMARY
DISCHARGE SUMMARY NOTE:      Patient Identification  PATIENT: Billi Riedel   MRN: [id number]  :  1945  Admit Date:  8/3/2022  Discharge date:  22                                   Disposition: home  Discharged Condition:  good  Discharge Diagnoses:   Patient Active Problem List   Diagnosis    Renal cancer, left ([facility] [address]       Consults: none    Surgery: Robotic left radical nephrectomy on 8/3/22    Patient Instructions: Activity: Per instructions  Diet: As tolerated  Patient told to follow up with Dr. Parul Carr in 1 weeks  Discharge Medications:     Medication List        START taking these medications      docusate sodium 100 MG capsule  Commonly known as: COLACE  Take 1 capsule by mouth in the morning and 1 capsule before bedtime. polyethylene glycol 17 GM/SCOOP powder  Commonly known as: GLYCOLAX  Take 17 g by mouth in the morning. traMADol 50 MG tablet  Commonly known as: Ultram  Take 1 tablet by mouth every 6 hours as needed for Pain for up to 4 days. Intended supply: 3 days. Take lowest dose possible to manage pain            CHANGE how you take these medications      lisinopril 40 MG tablet  Commonly known as: PRINIVIL;ZESTRIL  What changed: Another medication with the same name was removed. Continue taking this medication, and follow the directions you see here.             CONTINUE taking these medications      acidophilus/citrus pectin Tabs     amLODIPine 10 MG tablet  Commonly known as: NORVASC     butalbital-APAP-caffeine -40 MG Caps per capsule     carvedilol 12.5 MG tablet  Commonly known as: COREG     Cholecalciferol 50 MCG ( UT) Tabs     CoQ-10 100 MG Caps     FIBER-CAPS PO     Flaxseed Oil 1000 MG Caps     hydrALAZINE 10 MG tablet  Commonly known as: APRESOLINE     magnesium oxide 400 MG tablet  Commonly known as: MAG-OX     MVI (OPURITY BAND OPTIMIZED) CHEW TAB     pantoprazole 40 MG tablet  Commonly known as: PROTONIX     simvastatin 20 MG tablet  Commonly known as: ZOCOR Where to Get Your Medications        These medications were sent to [facility] [address] [facility], [address] [facility]  [address], [address] [address]      Phone: [phone]   docusate sodium 100 MG capsule  polyethylene glycol 17 GM/SCOOP powder  traMADol 50 MG tablet         Hospital course:   78year old female with a left 2.5 cm renal mass that was close to the hilum underwent a left robotic nephrectomy due to tumor extension into the main renal vein on 8/3/22. She tolerated the procedure well. She was extubated in the OR and transferred to the PACU without incident. On post operative day 1, she was on 4L NC and was vomiting. She was scaled back to a clear liquid diet. On post operative day 2, she was on 2L NC. Her pain was controlled but was still nauseous. [place] was removed and passed void trial. On post operative day 3, she was weaned off oxygen, and advanced to a regular diet. She tolerated her food and was deemed stable and ready for discharge on post operative day 3.  She was instructed to follow up in 1 week with Dr. Molly Tan MD  1:08 PM 8/6/2022

## 2022-08-19 NOTE — PROGRESS NOTES
Physician Progress Note      PATIENTErskin Sandhoff  CSN #:                  [id number]  :                       1945  ADMIT DATE:       8/3/2022 5:55 AM  DISCH DATE:        2022 2:18 PM  RESPONDING  PROVIDER #:        Maria Elena Dutton MD          QUERY TEXT:    Pt admitted with CA Left Kidney/Nephrectomy. Pt noted to have HMG pre-op   12.8>10.4. If possible, please document in the progress notes and discharge   summary if you are evaluating and/or treating any of the following: The medical record reflects the following:  Risk Factors: Ca Kidney, S/P Left Nephrectomy,  Clinical Indicators: OP Note 8/3 Radical Left Nephrectomy EBL <100CC. HMg   12.8>10.4. 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 Query created by:  Nathalia Mary on 2022 11:36 AM      Electronically signed by:  Marai Elena Dutton MD 2022 1:37 PM

## 2023-09-28 ENCOUNTER — HOSPITAL ENCOUNTER (OUTPATIENT)
Age: 78
Setting detail: SPECIMEN
Discharge: HOME | End: 2023-09-28

## 2023-09-28 LAB
ALBUMIN SERPL-MCNC: 4.2 G/DL (ref 3.5–5.2)
ALBUMIN/GLOB SERPL: 1.4 {RATIO} (ref 1–2.5)
ALP SERPL-CCNC: 111 U/L (ref 35–104)
ALT SERPL-CCNC: 13 U/L (ref 5–33)
ANION GAP SERPL CALCULATED.3IONS-SCNC: 18 MMOL/L (ref 9–17)
AST SERPL-CCNC: 17 U/L
BASOPHILS # BLD: 0.06 K/UL (ref 0–0.2)
BASOPHILS NFR BLD: 1 % (ref 0–2)
BILIRUB SERPL-MCNC: 0.2 MG/DL (ref 0.3–1.2)
BUN SERPL-MCNC: 18 MG/DL (ref 8–23)
CALCIUM SERPL-MCNC: 9.6 MG/DL (ref 8.6–10.4)
CHLORIDE SERPL-SCNC: 100 MMOL/L (ref 98–107)
CHOLEST SERPL-MCNC: 214 MG/DL
CHOLESTEROL/HDL RATIO: 5.2
CO2 SERPL-SCNC: 22 MMOL/L (ref 20–31)
CREAT SERPL-MCNC: 1.2 MG/DL (ref 0.5–0.9)
EOSINOPHIL # BLD: 0.24 K/UL (ref 0–0.44)
EOSINOPHILS RELATIVE PERCENT: 3 % (ref 1–4)
ERYTHROCYTE [DISTWIDTH] IN BLOOD BY AUTOMATED COUNT: 13.2 % (ref 11.8–14.4)
EST. AVERAGE GLUCOSE BLD GHB EST-MCNC: 117 MG/DL
GFR SERPL CREATININE-BSD FRML MDRD: 47 ML/MIN/1.73M2
GLUCOSE SERPL-MCNC: 113 MG/DL (ref 70–99)
HBA1C MFR BLD: 5.7 % (ref 4–6)
HCT VFR BLD AUTO: 40.1 % (ref 36.3–47.1)
HDLC SERPL-MCNC: 41 MG/DL
HGB BLD-MCNC: 13.2 G/DL (ref 11.9–15.1)
IMM GRANULOCYTES # BLD AUTO: 0.03 K/UL (ref 0–0.3)
IMM GRANULOCYTES NFR BLD: 0 %
LDLC SERPL CALC-MCNC: 104 MG/DL (ref 0–130)
LYMPHOCYTES NFR BLD: 2.86 K/UL (ref 1.1–3.7)
LYMPHOCYTES RELATIVE PERCENT: 36 % (ref 24–43)
MCH RBC QN AUTO: 30.2 PG (ref 25.2–33.5)
MCHC RBC AUTO-ENTMCNC: 32.9 G/DL (ref 28.4–34.8)
MCV RBC AUTO: 91.8 FL (ref 82.6–102.9)
MONOCYTES NFR BLD: 0.87 K/UL (ref 0.1–1.2)
MONOCYTES NFR BLD: 11 % (ref 3–12)
NEUTROPHILS NFR BLD: 49 % (ref 36–65)
NEUTS SEG NFR BLD: 3.86 K/UL (ref 1.5–8.1)
NRBC BLD-RTO: 0 PER 100 WBC
PLATELET # BLD AUTO: 328 K/UL (ref 138–453)
PMV BLD AUTO: 10.5 FL (ref 8.1–13.5)
POTASSIUM SERPL-SCNC: 4.4 MMOL/L (ref 3.7–5.3)
PROT SERPL-MCNC: 7.2 G/DL (ref 6.4–8.3)
RBC # BLD AUTO: 4.37 M/UL (ref 3.95–5.11)
SODIUM SERPL-SCNC: 140 MMOL/L (ref 135–144)
TRIGL SERPL-MCNC: 344 MG/DL
WBC OTHER # BLD: 7.9 K/UL (ref 3.5–11.3)

## 2023-09-30 VITALS
HEART RATE: 75 BPM | DIASTOLIC BLOOD PRESSURE: 70 MMHG | SYSTOLIC BLOOD PRESSURE: 140 MMHG | BODY MASS INDEX: 24.14 KG/M2 | HEIGHT: 64 IN | WEIGHT: 141.38 LBS

## 2023-09-30 PROBLEM — G89.29 CHRONIC PAIN: Status: ACTIVE | Noted: 2023-09-30

## 2023-09-30 PROBLEM — I10 ESSENTIAL HYPERTENSION: Status: ACTIVE | Noted: 2022-09-12

## 2023-09-30 PROBLEM — N28.9 KIDNEY DISEASE: Status: ACTIVE | Noted: 2022-08-03

## 2023-09-30 PROBLEM — E78.2 MIXED HYPERLIPIDEMIA: Status: ACTIVE | Noted: 2023-09-30

## 2023-09-30 PROBLEM — G93.32 CHRONIC FATIGUE SYNDROME: Status: ACTIVE | Noted: 2023-09-30

## 2023-09-30 PROBLEM — M47.812 CERVICAL SPONDYLOSIS WITHOUT MYELOPATHY: Status: ACTIVE | Noted: 2023-09-30

## 2023-09-30 PROBLEM — E11.9 DM2 (DIABETES MELLITUS, TYPE 2) (HCC): Status: ACTIVE | Noted: 2022-09-12

## 2023-09-30 PROBLEM — M81.0 AGE RELATED OSTEOPOROSIS: Status: ACTIVE | Noted: 2023-09-30

## 2023-09-30 PROBLEM — K21.9 GASTRO-ESOPHAGEAL REFLUX DISEASE WITHOUT ESOPHAGITIS: Status: ACTIVE | Noted: 2022-09-12

## 2023-09-30 PROBLEM — R51.9 HEADACHE DISORDER: Status: ACTIVE | Noted: 2023-09-30

## 2023-09-30 SDOH — ECONOMIC STABILITY: FOOD INSECURITY: WITHIN THE PAST 12 MONTHS, YOU WORRIED THAT YOUR FOOD WOULD RUN OUT BEFORE YOU GOT MONEY TO BUY MORE.: NEVER TRUE

## 2023-09-30 SDOH — ECONOMIC STABILITY: FOOD INSECURITY: WITHIN THE PAST 12 MONTHS, THE FOOD YOU BOUGHT JUST DIDN'T LAST AND YOU DIDN'T HAVE MONEY TO GET MORE.: NEVER TRUE

## 2023-09-30 SDOH — ECONOMIC STABILITY: INCOME INSECURITY: HOW HARD IS IT FOR YOU TO PAY FOR THE VERY BASICS LIKE FOOD, HOUSING, MEDICAL CARE, AND HEATING?: NOT HARD AT ALL

## 2023-09-30 SDOH — ECONOMIC STABILITY: HOUSING INSECURITY
IN THE LAST 12 MONTHS, WAS THERE A TIME WHEN YOU DID NOT HAVE A STEADY PLACE TO SLEEP OR SLEPT IN A SHELTER (INCLUDING NOW)?: NO

## 2023-09-30 SDOH — ECONOMIC STABILITY: TRANSPORTATION INSECURITY
IN THE PAST 12 MONTHS, HAS LACK OF TRANSPORTATION KEPT YOU FROM MEETINGS, WORK, OR FROM GETTING THINGS NEEDED FOR DAILY LIVING?: NO

## 2023-10-03 ENCOUNTER — OFFICE VISIT (OUTPATIENT)
Age: 78
End: 2023-10-03
Payer: MEDICARE

## 2023-10-03 VITALS
OXYGEN SATURATION: 97 % | HEART RATE: 71 BPM | SYSTOLIC BLOOD PRESSURE: 116 MMHG | TEMPERATURE: 97.2 F | HEIGHT: 64 IN | BODY MASS INDEX: 24.92 KG/M2 | RESPIRATION RATE: 14 BRPM | WEIGHT: 146 LBS | DIASTOLIC BLOOD PRESSURE: 68 MMHG

## 2023-10-03 DIAGNOSIS — M81.0 AGE RELATED OSTEOPOROSIS, UNSPECIFIED PATHOLOGICAL FRACTURE PRESENCE: ICD-10-CM

## 2023-10-03 DIAGNOSIS — R11.0 NAUSEA: Primary | ICD-10-CM

## 2023-10-03 DIAGNOSIS — E55.9 VITAMIN D DEFICIENCY: ICD-10-CM

## 2023-10-03 DIAGNOSIS — Z85.528 HISTORY OF RENAL CELL CANCER: ICD-10-CM

## 2023-10-03 DIAGNOSIS — R53.83 FATIGUE, UNSPECIFIED TYPE: ICD-10-CM

## 2023-10-03 DIAGNOSIS — I10 ESSENTIAL HYPERTENSION: ICD-10-CM

## 2023-10-03 DIAGNOSIS — E11.9 TYPE 2 DIABETES MELLITUS WITHOUT COMPLICATION, WITHOUT LONG-TERM CURRENT USE OF INSULIN (HCC): ICD-10-CM

## 2023-10-03 DIAGNOSIS — R19.7 DIARRHEA, UNSPECIFIED TYPE: ICD-10-CM

## 2023-10-03 DIAGNOSIS — K21.9 GASTRO-ESOPHAGEAL REFLUX DISEASE WITHOUT ESOPHAGITIS: ICD-10-CM

## 2023-10-03 DIAGNOSIS — Z12.31 BREAST CANCER SCREENING BY MAMMOGRAM: ICD-10-CM

## 2023-10-03 PROBLEM — C64.1 CANCER OF RIGHT KIDNEY (HCC): Status: ACTIVE | Noted: 2023-10-03

## 2023-10-03 PROBLEM — E78.00 HYPERCHOLESTEROLEMIA: Status: ACTIVE | Noted: 2023-10-03

## 2023-10-03 PROBLEM — C64.2 RENAL CELL CANCER, LEFT (HCC): Status: ACTIVE | Noted: 2022-09-12

## 2023-10-03 PROCEDURE — 3078F DIAST BP <80 MM HG: CPT | Performed by: FAMILY MEDICINE

## 2023-10-03 PROCEDURE — 1123F ACP DISCUSS/DSCN MKR DOCD: CPT | Performed by: FAMILY MEDICINE

## 2023-10-03 PROCEDURE — 3044F HG A1C LEVEL LT 7.0%: CPT | Performed by: FAMILY MEDICINE

## 2023-10-03 PROCEDURE — 99214 OFFICE O/P EST MOD 30 MIN: CPT | Performed by: FAMILY MEDICINE

## 2023-10-03 PROCEDURE — 3074F SYST BP LT 130 MM HG: CPT | Performed by: FAMILY MEDICINE

## 2023-10-03 RX ORDER — ONDANSETRON 4 MG/1
4 TABLET, ORALLY DISINTEGRATING ORAL 3 TIMES DAILY PRN
Qty: 21 TABLET | Refills: 0 | Status: SHIPPED | OUTPATIENT
Start: 2023-10-03

## 2023-10-03 RX ORDER — CHOLESTYRAMINE 4 G/9G
POWDER, FOR SUSPENSION ORAL
Qty: 90 PACKET | Refills: 3 | Status: SHIPPED | OUTPATIENT
Start: 2023-10-03

## 2023-10-03 ASSESSMENT — PATIENT HEALTH QUESTIONNAIRE - PHQ9
2. FEELING DOWN, DEPRESSED OR HOPELESS: 0
SUM OF ALL RESPONSES TO PHQ9 QUESTIONS 1 & 2: 0
SUM OF ALL RESPONSES TO PHQ QUESTIONS 1-9: 0
1. LITTLE INTEREST OR PLEASURE IN DOING THINGS: 0
SUM OF ALL RESPONSES TO PHQ QUESTIONS 1-9: 0

## 2023-10-03 ASSESSMENT — ENCOUNTER SYMPTOMS
CONSTIPATION: 0
DIARRHEA: 1
NAUSEA: 1
BLOOD IN STOOL: 0
VOMITING: 0

## 2023-10-03 NOTE — PROGRESS NOTES
rate and regular rhythm. Heart sounds: No murmur heard. Pulmonary:      Effort: Pulmonary effort is normal.      Breath sounds: Normal breath sounds. No wheezing. Abdominal:      General: Abdomen is flat. There is no distension. Palpations: Abdomen is soft. Tenderness: There is abdominal tenderness. There is no guarding. Comments: Mostly tender epigastric region   Musculoskeletal:         General: Normal range of motion. Cervical back: Normal range of motion. Right lower leg: No edema. Left lower leg: No edema. Skin:     General: Skin is warm. Neurological:      Mental Status: She is alert and oriented to person, place, and time. Psychiatric:         Mood and Affect: Mood normal.         Behavior: Behavior normal.         Thought Content: Thought content normal.         Judgment: Judgment normal.           ASSESSMENT/PLAN     1. Nausea  Assessment & Plan:    Questran for any early peptic ulcer disease, get ultrasound abdomen, refer to GI for further evaluation, Zofran as needed. Continue ppi  Orders:  -     Tamra Thorne MD, Gastroenterology, [place]  -     ondansetron (ZOFRAN-ODT) 4 MG disintegrating tablet; Take 1 tablet by mouth 3 times daily as needed for Nausea or Vomiting, Disp-21 tablet, R-0Normal  -     US ABDOMEN LIMITED; Future  2. Essential hypertension  Assessment & Plan:    blood pressure well controlled on carvedilol, amlodipine, continue, reviewed renal function stable with creatinine 1.2  3. Fatigue, unspecified type  -     CBC; Future  -     Comprehensive Metabolic Panel with Bilirubin; Future  4. Vitamin D deficiency  5. Breast cancer screening by mammogram  -     [facility] DIGITAL SCREEN W OR WO CAD BILATERAL; Future  6. Age related osteoporosis, unspecified pathological fracture presence  -     DEXA BONE DENSITY 2 SITES; Future  7.  Diarrhea, unspecified type  Assessment & Plan:    diarrhea could be related to having cholecystectomy, low-fat diet, try

## 2023-10-03 NOTE — ASSESSMENT & PLAN NOTE
this is diet controlled and improved, reviewed hemoglobin A1c 5.7, previously= 6.4, 6.2, fasting glucose 113, continue to watch carbohydrate intake and get regular exercise

## 2023-10-03 NOTE — ASSESSMENT & PLAN NOTE
Questran for any early peptic ulcer disease, get ultrasound abdomen, refer to GI for further evaluation, Zofran as needed.  Continue ppi

## 2023-10-03 NOTE — ASSESSMENT & PLAN NOTE
she has had nephrectomy, following with urology and has imaging surveillance study planned for this winter and follow-up.

## 2023-10-03 NOTE — ASSESSMENT & PLAN NOTE
diarrhea could be related to having cholecystectomy, low-fat diet, try Questran, instructed on dose titration, refer to GI for further evaluation. She already takes 2 fiber tablets daily and still has diarrhea.

## 2023-10-05 ENCOUNTER — TRANSCRIBE ORDERS (OUTPATIENT)
Dept: ADMINISTRATIVE | Age: 78
End: 2023-10-05

## 2023-10-05 DIAGNOSIS — N28.9 DISORDER OF KIDNEY AND URETER, UNSPECIFIED: Primary | ICD-10-CM

## 2023-10-11 ENCOUNTER — HOSPITAL ENCOUNTER (OUTPATIENT)
Dept: CT IMAGING | Age: 78
Discharge: HOME | End: 2023-10-13
Attending: UROLOGY
Payer: MEDICARE

## 2023-10-11 ENCOUNTER — HOSPITAL ENCOUNTER (OUTPATIENT)
Dept: ULTRASOUND IMAGING | Age: 78
Discharge: HOME | End: 2023-10-13
Attending: UROLOGY
Payer: MEDICARE

## 2023-10-11 DIAGNOSIS — N28.9 DISORDER OF KIDNEY AND URETER, UNSPECIFIED: ICD-10-CM

## 2023-10-11 DIAGNOSIS — R11.0 NAUSEA: ICD-10-CM

## 2023-10-11 PROCEDURE — 76705 ECHO EXAM OF ABDOMEN: CPT

## 2023-10-11 PROCEDURE — 74176 CT ABD & PELVIS W/O CONTRAST: CPT

## 2023-10-27 RX ORDER — PANTOPRAZOLE SODIUM 40 MG/1
40 TABLET, DELAYED RELEASE ORAL 2 TIMES DAILY
Qty: 180 TABLET | Refills: 1 | Status: SHIPPED | OUTPATIENT
Start: 2023-10-27

## 2023-10-27 NOTE — TELEPHONE ENCOUNTER
Link Valentine is calling to request a refill on the following medication(s):    Medication Request:  Requested Prescriptions     Pending Prescriptions Disp Refills    pantoprazole (PROTONIX) 40 MG tablet [Pharmacy Med Name: PANTOPRAZOLE SOD DR 40 MG TAB] 540 tablet      Sig: take 1 tablet by mouth twice a day       Last Visit Date (If Applicable):  64/6/4636    Next Visit Date:    1/23/2024

## 2023-10-30 RX ORDER — BUTALBITAL, ACETAMINOPHEN AND CAFFEINE 50; 325; 40 MG/1; MG/1; MG/1
TABLET ORAL
Qty: 90 TABLET | Refills: 0 | Status: SHIPPED | OUTPATIENT
Start: 2023-10-30

## 2023-10-30 RX ORDER — AMLODIPINE BESYLATE 10 MG/1
10 TABLET ORAL DAILY
Qty: 180 TABLET | Refills: 1 | Status: SHIPPED | OUTPATIENT
Start: 2023-10-30

## 2023-10-30 NOTE — TELEPHONE ENCOUNTER
Radha Mendoza is calling to request a refill on the following medication(s):    Medication Request:  Requested Prescriptions     Pending Prescriptions Disp Refills    amLODIPine (NORVASC) 10 MG tablet [Pharmacy Med Name: AMLODIPINE BESYLATE 10 MG TAB] 180 tablet      Sig: take 1 tablet by mouth once daily    butalbital-acetaminophen-caffeine (FIORICET, ESGIC) -40 MG per tablet [Pharmacy Med Name: JFXTUJ-MTIWINPU-BPZZ -40] 90 tablet      Sig: take 1 tablet by mouth daily if needed OVERUSE MAY CAUSE REBOUND HEADACHE       Last Visit Date (If Applicable):  70/5/6452    Next Visit Date:    1/23/2024

## 2023-11-16 ENCOUNTER — HOSPITAL ENCOUNTER (OUTPATIENT)
Dept: MAMMOGRAPHY | Age: 78
Discharge: HOME | End: 2023-11-18
Attending: FAMILY MEDICINE
Payer: MEDICARE

## 2023-11-16 VITALS — HEIGHT: 64 IN | WEIGHT: 142 LBS | BODY MASS INDEX: 24.24 KG/M2

## 2023-11-16 DIAGNOSIS — M81.0 AGE RELATED OSTEOPOROSIS, UNSPECIFIED PATHOLOGICAL FRACTURE PRESENCE: ICD-10-CM

## 2023-11-16 DIAGNOSIS — Z12.31 BREAST CANCER SCREENING BY MAMMOGRAM: ICD-10-CM

## 2023-11-16 PROCEDURE — 77080 DXA BONE DENSITY AXIAL: CPT

## 2023-11-16 PROCEDURE — 77063 BREAST TOMOSYNTHESIS BI: CPT

## 2023-11-24 RX ORDER — SIMVASTATIN 20 MG
10 TABLET ORAL DAILY
Qty: 135 TABLET | Refills: 1 | Status: SHIPPED | OUTPATIENT
Start: 2023-11-24

## 2023-11-24 NOTE — TELEPHONE ENCOUNTER
Davis Calle is calling to request a refill on the following medication(s):    Medication Request:  Requested Prescriptions     Pending Prescriptions Disp Refills    simvastatin (ZOCOR) 20 MG tablet [Pharmacy Med Name: SIMVASTATIN 20 MG TABLET] 135 tablet      Sig: take 1/2 tablet by mouth once daily       Last Visit Date (If Applicable):  73/4/2388    Next Visit Date:    1/23/2024

## 2024-01-18 ENCOUNTER — HOSPITAL ENCOUNTER (OUTPATIENT)
Age: 79
Setting detail: SPECIMEN
Discharge: HOME | End: 2024-01-18

## 2024-01-18 LAB
25(OH)D3 SERPL-MCNC: 75.1 NG/ML (ref 30–100)
ANION GAP SERPL CALCULATED.3IONS-SCNC: 14 MMOL/L (ref 9–16)
BACTERIA URNS QL MICRO: NORMAL
BILIRUB UR QL STRIP: NEGATIVE
BUN SERPL-MCNC: 18 MG/DL (ref 8–23)
CALCIUM SERPL-MCNC: 9.3 MG/DL (ref 8.6–10.4)
CASTS #/AREA URNS LPF: NORMAL /LPF (ref 0–8)
CHLORIDE SERPL-SCNC: 101 MMOL/L (ref 98–107)
CLARITY UR: CLEAR
CO2 SERPL-SCNC: 24 MMOL/L (ref 20–31)
COLOR UR: YELLOW
CREAT SERPL-MCNC: 1.1 MG/DL (ref 0.5–0.9)
CREAT UR-MCNC: 77.9 MG/DL (ref 28–217)
EPI CELLS #/AREA URNS HPF: NORMAL /HPF (ref 0–5)
ERYTHROCYTE [DISTWIDTH] IN BLOOD BY AUTOMATED COUNT: 13.3 % (ref 11.8–14.4)
GFR SERPL CREATININE-BSD FRML MDRD: 50 ML/MIN/1.73M2
GLUCOSE SERPL-MCNC: 131 MG/DL (ref 74–99)
GLUCOSE UR STRIP-MCNC: NEGATIVE MG/DL
HCT VFR BLD AUTO: 39.3 % (ref 36.3–47.1)
HGB BLD-MCNC: 12.8 G/DL (ref 11.9–15.1)
HGB UR QL STRIP.AUTO: NEGATIVE
KETONES UR STRIP-MCNC: NEGATIVE MG/DL
LEUKOCYTE ESTERASE UR QL STRIP: ABNORMAL
MAGNESIUM SERPL-MCNC: 2.4 MG/DL (ref 1.6–2.4)
MCH RBC QN AUTO: 29.2 PG (ref 25.2–33.5)
MCHC RBC AUTO-ENTMCNC: 32.6 G/DL (ref 28.4–34.8)
MCV RBC AUTO: 89.7 FL (ref 82.6–102.9)
NITRITE UR QL STRIP: NEGATIVE
NRBC BLD-RTO: 0 PER 100 WBC
PH UR STRIP: 6.5 [PH] (ref 5–8)
PHOSPHATE SERPL-MCNC: 3.7 MG/DL (ref 2.5–4.5)
PLATELET # BLD AUTO: 270 K/UL (ref 138–453)
PMV BLD AUTO: 10.3 FL (ref 8.1–13.5)
POTASSIUM SERPL-SCNC: 4.1 MMOL/L (ref 3.7–5.3)
PROT UR STRIP-MCNC: NEGATIVE MG/DL
PTH-INTACT SERPL-MCNC: 50 PG/ML (ref 15–65)
RBC # BLD AUTO: 4.38 M/UL (ref 3.95–5.11)
RBC #/AREA URNS HPF: NORMAL /HPF (ref 0–4)
SODIUM SERPL-SCNC: 139 MMOL/L (ref 136–145)
SP GR UR STRIP: 1.01 (ref 1–1.03)
TOTAL PROTEIN, URINE: 9 MG/DL
URINE TOTAL PROTEIN CREATININE RATIO: 0.12
UROBILINOGEN UR STRIP-ACNC: NORMAL EU/DL (ref 0–1)
WBC #/AREA URNS HPF: NORMAL /HPF (ref 0–5)
WBC OTHER # BLD: 7.3 K/UL (ref 3.5–11.3)

## 2024-01-23 ENCOUNTER — OFFICE VISIT (OUTPATIENT)
Age: 79
End: 2024-01-23
Payer: MEDICARE

## 2024-01-23 VITALS
HEIGHT: 64 IN | BODY MASS INDEX: 24.62 KG/M2 | DIASTOLIC BLOOD PRESSURE: 76 MMHG | HEART RATE: 73 BPM | SYSTOLIC BLOOD PRESSURE: 118 MMHG | OXYGEN SATURATION: 94 % | WEIGHT: 144.2 LBS | TEMPERATURE: 97.8 F

## 2024-01-23 DIAGNOSIS — M47.22 CERVICAL RADICULOPATHY DUE TO DEGENERATIVE JOINT DISEASE OF SPINE: ICD-10-CM

## 2024-01-23 DIAGNOSIS — R53.83 FATIGUE, UNSPECIFIED TYPE: Primary | ICD-10-CM

## 2024-01-23 DIAGNOSIS — I10 ESSENTIAL HYPERTENSION: ICD-10-CM

## 2024-01-23 DIAGNOSIS — Z85.528 HISTORY OF RENAL CELL CANCER: ICD-10-CM

## 2024-01-23 DIAGNOSIS — E11.9 TYPE 2 DIABETES MELLITUS WITHOUT COMPLICATION, WITHOUT LONG-TERM CURRENT USE OF INSULIN (HCC): ICD-10-CM

## 2024-01-23 DIAGNOSIS — E78.5 DYSLIPIDEMIA: ICD-10-CM

## 2024-01-23 DIAGNOSIS — C64.1 CANCER OF RIGHT KIDNEY (HCC): ICD-10-CM

## 2024-01-23 DIAGNOSIS — E55.9 VITAMIN D DEFICIENCY: ICD-10-CM

## 2024-01-23 DIAGNOSIS — K58.9 IRRITABLE BOWEL SYNDROME, UNSPECIFIED TYPE: ICD-10-CM

## 2024-01-23 DIAGNOSIS — M51.36 LUMBAR DEGENERATIVE DISC DISEASE: ICD-10-CM

## 2024-01-23 DIAGNOSIS — R51.9 HEADACHE DISORDER: ICD-10-CM

## 2024-01-23 DIAGNOSIS — N18.31 STAGE 3A CHRONIC KIDNEY DISEASE (HCC): ICD-10-CM

## 2024-01-23 DIAGNOSIS — M81.0 AGE-RELATED OSTEOPOROSIS WITHOUT CURRENT PATHOLOGICAL FRACTURE: ICD-10-CM

## 2024-01-23 PROBLEM — M51.369 LUMBAR DEGENERATIVE DISC DISEASE: Status: ACTIVE | Noted: 2024-01-23

## 2024-01-23 PROCEDURE — 1123F ACP DISCUSS/DSCN MKR DOCD: CPT | Performed by: FAMILY MEDICINE

## 2024-01-23 PROCEDURE — 3074F SYST BP LT 130 MM HG: CPT | Performed by: FAMILY MEDICINE

## 2024-01-23 PROCEDURE — 3078F DIAST BP <80 MM HG: CPT | Performed by: FAMILY MEDICINE

## 2024-01-23 PROCEDURE — 99214 OFFICE O/P EST MOD 30 MIN: CPT | Performed by: FAMILY MEDICINE

## 2024-01-23 RX ORDER — SIMVASTATIN 20 MG
10 TABLET ORAL DAILY
Qty: 135 TABLET | Refills: 3 | Status: SHIPPED | OUTPATIENT
Start: 2024-01-23

## 2024-01-23 RX ORDER — TOPIRAMATE 25 MG/1
25 TABLET ORAL NIGHTLY
Qty: 30 TABLET | Refills: 1 | Status: SHIPPED | OUTPATIENT
Start: 2024-01-23

## 2024-01-23 RX ORDER — CARVEDILOL 12.5 MG/1
12.5 TABLET ORAL 2 TIMES DAILY WITH MEALS
Qty: 180 TABLET | Refills: 1 | Status: SHIPPED | OUTPATIENT
Start: 2024-01-23

## 2024-01-23 RX ORDER — PANTOPRAZOLE SODIUM 40 MG/1
40 TABLET, DELAYED RELEASE ORAL 2 TIMES DAILY
Qty: 180 TABLET | Refills: 1 | Status: SHIPPED | OUTPATIENT
Start: 2024-01-23

## 2024-01-23 RX ORDER — AMLODIPINE BESYLATE 10 MG/1
10 TABLET ORAL DAILY
Qty: 180 TABLET | Refills: 1 | Status: SHIPPED | OUTPATIENT
Start: 2024-01-23

## 2024-01-23 RX ORDER — BUTALBITAL, ACETAMINOPHEN AND CAFFEINE 50; 325; 40 MG/1; MG/1; MG/1
TABLET ORAL
Qty: 90 TABLET | Refills: 0 | Status: SHIPPED | OUTPATIENT
Start: 2024-01-23

## 2024-01-23 ASSESSMENT — ENCOUNTER SYMPTOMS: SHORTNESS OF BREATH: 0

## 2024-01-23 ASSESSMENT — PATIENT HEALTH QUESTIONNAIRE - PHQ9
SUM OF ALL RESPONSES TO PHQ QUESTIONS 1-9: 0
SUM OF ALL RESPONSES TO PHQ QUESTIONS 1-9: 0
SUM OF ALL RESPONSES TO PHQ9 QUESTIONS 1 & 2: 0
1. LITTLE INTEREST OR PLEASURE IN DOING THINGS: 0
2. FEELING DOWN, DEPRESSED OR HOPELESS: 0
SUM OF ALL RESPONSES TO PHQ QUESTIONS 1-9: 0
SUM OF ALL RESPONSES TO PHQ QUESTIONS 1-9: 0

## 2024-01-23 NOTE — TELEPHONE ENCOUNTER
Rut Burnham is calling to request a refill on the following medication(s):    Medication Request:  Requested Prescriptions     Pending Prescriptions Disp Refills    carvedilol (COREG) 12.5 MG tablet [Pharmacy Med Name: CARVEDILOL 12.5 MG TABLET] 360 tablet      Sig: take 1 tablet by mouth twice a day with food       Last Visit Date (If Applicable):  1/23/2024    Next Visit Date:    5/31/2024

## 2024-01-23 NOTE — PROGRESS NOTES
[facility]     Date of Visit:  2024  Patient Name: Rut Burnham   Patient :  1945     CHIEF COMPLAINT/HPI:     Rut Burnham is a 78 y.o. female who presents today for an general visit to be evaluated for the following condition(s):  Chief Complaint   Patient presents with    Blood Work     She is here for her 3 month check up with labs done on 2024. She also needs all her medications refilled.    Patient here for routine visit.  She had left nephrectomy in the last year for renal cell cancer.  She also has a lesion on the right kidney/remaining kidney which urology had been watching.  Most recent CT scan in October indicated lesion looked increased in size.  Urology saw her recently and will get a follow-up CT in a year.  She also saw GI for ongoing epigastric pain nausea and diarrhea since her gallbladder was removed early last year.  She was given Questran which did help the loose stools but it is expensive and very difficult to take.  They suggested taking Metamucil which she has been doing and it does help the loose stools.  She does have mid and lower abdominal cramping at times but nothing severe, no blood in stool.  She gets massages every 2 weeks on back and neck which helps manage her musculoskeletal pain.  Needs new prescription for this.  She follows with nephrology since her nephrectomy.  She has been on Midrin long-term, she takes 1 tablet every morning, got in the habit of this a long time ago and never needed additional tablets through the day.  Insurance will no longer cover it so she wonders what she should take in lieu of this    REVIEW OF SYSTEM      Review of Systems   Respiratory:  Negative for shortness of breath.    Cardiovascular:  Negative for chest pain and leg swelling.   Genitourinary:  Negative for decreased urine volume and urgency.   All other systems reviewed and are negative.        REVIEWED INFORMATION      Allergies   Allergen Reactions    Latex Rash

## 2024-01-24 RX ORDER — CARVEDILOL 12.5 MG/1
12.5 TABLET ORAL 2 TIMES DAILY WITH MEALS
Qty: 360 TABLET | OUTPATIENT
Start: 2024-01-24

## 2024-01-24 NOTE — ASSESSMENT & PLAN NOTE
blood pressure well controlled on carvedilol, amlodipine, continue, reviewed renal function stable with creatinine 1.1, GFR stable, recent urine protein to creatinine ratio normal as well.  PTH normal CBC normal and vitamin D 75/nORMAL

## 2024-01-24 NOTE — ASSESSMENT & PLAN NOTE
with insurance no longer covering Midrin, start Topamax daily, provide update in a few weeks if she is getting headaches with discontinuing Midrin to titrate topomax dose

## 2024-01-24 NOTE — ASSESSMENT & PLAN NOTE
urology monitoring renal lesion increasing on recent CT.  Refer to hematology oncology to see if there is any other testing or treatment she should be undergoing

## 2024-01-24 NOTE — ASSESSMENT & PLAN NOTE
reviewed recent DEXA results indicating increased FRAX score of 3.2 for the hips, she does not want to do bisphosphonate, continue vitamin D, weightbearing exercise, recent PTH and vitamin D levels

## 2024-01-24 NOTE — ASSESSMENT & PLAN NOTE
this is diet controlled and improved, reviewed hemoglobin A1c 5.7, previously= 6.4, 6.2, fasting glucose 113, continue to watch carbohydrate intake and get regular exercise .  Most recent lab done prior to this appointment glucose nonfasting was 131.

## 2024-01-24 NOTE — ASSESSMENT & PLAN NOTE
reviewed creatinine and GFR stable on recent labs, continue good blood pressure and blood sugar control, avoid NSAIDs, follows with nephrology

## 2024-01-31 ENCOUNTER — INITIAL CONSULT (OUTPATIENT)
Dept: ONCOLOGY | Age: 79
End: 2024-01-31
Payer: MEDICARE

## 2024-01-31 ENCOUNTER — TELEPHONE (OUTPATIENT)
Dept: ONCOLOGY | Age: 79
End: 2024-01-31

## 2024-01-31 VITALS
OXYGEN SATURATION: 95 % | HEART RATE: 76 BPM | SYSTOLIC BLOOD PRESSURE: 158 MMHG | BODY MASS INDEX: 24.86 KG/M2 | TEMPERATURE: 96.8 F | HEIGHT: 64 IN | WEIGHT: 145.6 LBS | RESPIRATION RATE: 18 BRPM | DIASTOLIC BLOOD PRESSURE: 86 MMHG

## 2024-01-31 DIAGNOSIS — C64.2 CANCER OF LEFT KIDNEY (HCC): ICD-10-CM

## 2024-01-31 DIAGNOSIS — C64.1 PRIMARY MALIGNANT NEOPLASM OF RIGHT KIDNEY WITH METASTASIS FROM KIDNEY TO OTHER SITE (HCC): Primary | ICD-10-CM

## 2024-01-31 PROCEDURE — 99204 OFFICE O/P NEW MOD 45 MIN: CPT | Performed by: INTERNAL MEDICINE

## 2024-01-31 PROCEDURE — 3077F SYST BP >= 140 MM HG: CPT | Performed by: INTERNAL MEDICINE

## 2024-01-31 PROCEDURE — 1123F ACP DISCUSS/DSCN MKR DOCD: CPT | Performed by: INTERNAL MEDICINE

## 2024-01-31 PROCEDURE — 3079F DIAST BP 80-89 MM HG: CPT | Performed by: INTERNAL MEDICINE

## 2024-01-31 NOTE — PROGRESS NOTES
separately reported), communicating results to the patient/family/caregiver and care coordination (not separately reported).      This note is created with the assistance of a speech recognition program.  While intending to generate a document that actually reflects the content of the visit, the document can still have some errors including those of syntax and sound a like substitutions which may escape proof reading.  It such instances, actual meaning can be extrapolated by contextual diversion.

## 2024-01-31 NOTE — TELEPHONE ENCOUNTER
LAbs and scans in 4 weeks  Rv in 5 weeks             CT scheduled for 2/28/24 @ 8am at [facility], labs to be done same day    Rv scheduled for 3/6/24 @ 1215pm    An After Visit Summary was printed and given to the patient.    Electronically signed by Anne Marie Nails on 1/31/2024 at 4:17 PM

## 2024-02-28 ENCOUNTER — HOSPITAL ENCOUNTER (OUTPATIENT)
Dept: CT IMAGING | Age: 79
Discharge: HOME | End: 2024-03-01
Attending: INTERNAL MEDICINE
Payer: MEDICARE

## 2024-02-28 ENCOUNTER — HOSPITAL ENCOUNTER (OUTPATIENT)
Age: 79
Setting detail: SPECIMEN
Discharge: HOME | End: 2024-02-28

## 2024-02-28 DIAGNOSIS — C64.1 PRIMARY MALIGNANT NEOPLASM OF RIGHT KIDNEY WITH METASTASIS FROM KIDNEY TO OTHER SITE (HCC): ICD-10-CM

## 2024-02-28 LAB
ALBUMIN SERPL-MCNC: 4.2 G/DL (ref 3.5–5.2)
ALBUMIN/GLOB SERPL: 1.6 {RATIO} (ref 1–2.5)
ALP SERPL-CCNC: 106 U/L (ref 35–104)
ALT SERPL-CCNC: 11 U/L (ref 5–33)
ANION GAP SERPL CALCULATED.3IONS-SCNC: 12 MMOL/L (ref 9–17)
AST SERPL-CCNC: 17 U/L
BASOPHILS # BLD: 0.04 K/UL (ref 0–0.2)
BASOPHILS NFR BLD: 1 % (ref 0–2)
BILIRUB SERPL-MCNC: 0.2 MG/DL (ref 0.3–1.2)
BUN SERPL-MCNC: 21 MG/DL (ref 8–23)
CALCIUM SERPL-MCNC: 9.3 MG/DL (ref 8.6–10.4)
CHLORIDE SERPL-SCNC: 104 MMOL/L (ref 98–107)
CO2 SERPL-SCNC: 25 MMOL/L (ref 20–31)
CREAT SERPL-MCNC: 1 MG/DL (ref 0.5–0.9)
EOSINOPHIL # BLD: 0.2 K/UL (ref 0–0.44)
EOSINOPHILS RELATIVE PERCENT: 3 % (ref 1–4)
ERYTHROCYTE [DISTWIDTH] IN BLOOD BY AUTOMATED COUNT: 13.3 % (ref 11.8–14.4)
GFR SERPL CREATININE-BSD FRML MDRD: 58 ML/MIN/1.73M2
GLUCOSE SERPL-MCNC: 130 MG/DL (ref 70–99)
HCT VFR BLD AUTO: 38.9 % (ref 36.3–47.1)
HGB BLD-MCNC: 12.9 G/DL (ref 11.9–15.1)
IMM GRANULOCYTES # BLD AUTO: 0.05 K/UL (ref 0–0.3)
IMM GRANULOCYTES NFR BLD: 1 %
LDH SERPL-CCNC: 164 U/L (ref 135–214)
LYMPHOCYTES NFR BLD: 2.27 K/UL (ref 1.1–3.7)
LYMPHOCYTES RELATIVE PERCENT: 30 % (ref 24–43)
MCH RBC QN AUTO: 29.6 PG (ref 25.2–33.5)
MCHC RBC AUTO-ENTMCNC: 33.2 G/DL (ref 28.4–34.8)
MCV RBC AUTO: 89.2 FL (ref 82.6–102.9)
MONOCYTES NFR BLD: 0.72 K/UL (ref 0.1–1.2)
MONOCYTES NFR BLD: 10 % (ref 3–12)
NEUTROPHILS NFR BLD: 55 % (ref 36–65)
NEUTS SEG NFR BLD: 4.22 K/UL (ref 1.5–8.1)
NRBC BLD-RTO: 0 PER 100 WBC
PLATELET # BLD AUTO: 291 K/UL (ref 138–453)
PMV BLD AUTO: 10.6 FL (ref 8.1–13.5)
POTASSIUM SERPL-SCNC: 4.2 MMOL/L (ref 3.7–5.3)
PROT SERPL-MCNC: 6.9 G/DL (ref 6.4–8.3)
RBC # BLD AUTO: 4.36 M/UL (ref 3.95–5.11)
SODIUM SERPL-SCNC: 141 MMOL/L (ref 135–144)
WBC OTHER # BLD: 7.5 K/UL (ref 3.5–11.3)

## 2024-02-28 PROCEDURE — 74176 CT ABD & PELVIS W/O CONTRAST: CPT

## 2024-03-06 ENCOUNTER — TELEPHONE (OUTPATIENT)
Dept: ONCOLOGY | Age: 79
End: 2024-03-06

## 2024-03-06 ENCOUNTER — OFFICE VISIT (OUTPATIENT)
Dept: ONCOLOGY | Age: 79
End: 2024-03-06
Payer: MEDICARE

## 2024-03-06 VITALS
RESPIRATION RATE: 18 BRPM | WEIGHT: 145.1 LBS | BODY MASS INDEX: 24.91 KG/M2 | HEART RATE: 78 BPM | OXYGEN SATURATION: 95 % | TEMPERATURE: 97.1 F | DIASTOLIC BLOOD PRESSURE: 81 MMHG | SYSTOLIC BLOOD PRESSURE: 139 MMHG

## 2024-03-06 DIAGNOSIS — C64.1 PRIMARY MALIGNANT NEOPLASM OF RIGHT KIDNEY WITH METASTASIS FROM KIDNEY TO OTHER SITE (HCC): Primary | ICD-10-CM

## 2024-03-06 DIAGNOSIS — C64.2 CANCER OF LEFT KIDNEY (HCC): ICD-10-CM

## 2024-03-06 PROCEDURE — 1123F ACP DISCUSS/DSCN MKR DOCD: CPT | Performed by: INTERNAL MEDICINE

## 2024-03-06 PROCEDURE — 3079F DIAST BP 80-89 MM HG: CPT | Performed by: INTERNAL MEDICINE

## 2024-03-06 PROCEDURE — 99214 OFFICE O/P EST MOD 30 MIN: CPT | Performed by: INTERNAL MEDICINE

## 2024-03-06 PROCEDURE — 99211 OFF/OP EST MAY X REQ PHY/QHP: CPT | Performed by: INTERNAL MEDICINE

## 2024-03-06 PROCEDURE — 3075F SYST BP GE 130 - 139MM HG: CPT | Performed by: INTERNAL MEDICINE

## 2024-03-06 NOTE — TELEPHONE ENCOUNTER
Will discuss case in Tumor board   Will coordinate follow up with navigator        Navigation team notified and referral put in.     Tumor board team also notified    Rv will be scheduled accordingly with navigator    An After Visit Summary was printed and given to the patient.    Electronically signed by Anne Marie Hinkle on 3/6/2024 at 2:03 PM

## 2024-03-06 NOTE — PROGRESS NOTES
Rut Burnham                                                                                                                  3/6/2024  MRN:   [id number]  YOB: 1945  PCP:                           Andres Brandt MD  Referring Physician: No ref. provider found  Treating Physician Name: DANNA ACOSTA MD    Reason for visit:  Chief Complaint   Patient presents with    Follow-up     5 week    Results     CT     Current problems:  Clear-cell carcinoma of left kidney status post radical nephrectomy, pathological stage T3a, NX s/p left radical nephrectomy-8/3/2022  Right kidney renal neoplasm, favor clear-cell carcinoma  Comorbidities: Dyslipidemia, CKD, hypertension    Active and recent treatments:  S/p left kidney radical nephrectomy-8/2022  Watch and wait approach for right kidney neoplasm    Interval history:  Patient presents to the clinic for a follow-up visit and to discuss results of her CT scan.  CT scan shows right kidney tumor 2.3 cm.  No evidence of regional or distant metastasis noted.  Denies abdominal pain.  Denies hematuria.    During this visit patient's allergy, social, medical, surgical history and medications were reviewed and updated.    Summary of Case/History:  Rut Burnham a 78 y.o.female is a patient with bilateral kidney cancer presents to the clinic to establish care and for further workup and evaluation.  Patient has biopsy-proven left and right kidney cancer.  Patient underwent left nephrectomy back in August 2022 which revealed grade 1 clear-cell carcinoma of left kidney, pathological stage T3a, NX.  Patient did not receive any adjuvant therapy.  Patient also has biopsy-proven right kidney cancer and has been under wait and watch approach.  Patient last CT scan done back in June did not show any acute abnormalities.  Again showed high attenuation focus to the upper pole of right kidney with hypoechoic solid-appearing lesion.  The suspicious lesion measured about

## 2024-03-06 NOTE — TELEPHONE ENCOUNTER
Name: Rut Burnham  : 1945  MRN: [id number]    Oncology Navigation- Initial Note: ( fist attempt : COVERING for Aristides Hogue)     Intake-  Contact Type: Telephone    Continuum of Care: Diagnosis/Active Treatment      Notes: Received referral for nurse navigator from Dr Moeller. Writer contacted pt, introduced self. Explained that Aristides will be her navigator but she is off today. Writer discussed barriers to care with pt and currently she denied barriers or questions.   Provided pt with writers contact information and encouraged her to call if she needed anything prior to Aristides reaching out ot her. Also provided pt with aristides contact information.     Plan for pt's case to be presented in Tumor board then f/u with Dr Moeller.     Electronically signed by Zulema Short RN on 3/6/2024 at 3:57 PM

## 2024-03-12 ENCOUNTER — TELEPHONE (OUTPATIENT)
Dept: ONCOLOGY | Age: 79
End: 2024-03-12

## 2024-03-12 NOTE — TELEPHONE ENCOUNTER
Name: Rut Burnham  : 1945  MRN: [id number]    Oncology Navigation- Initial Note:    Intake-  Contact Type: Telephone    Continuum of Care: Diagnosis/Active Treatment    Notes: Writer called pt and introduced self as navigator. Pt already had name and contact number previously. Writer explained my role in her care moving forward. No barriers to care identified today.    Pt states she was dx with kidney cancer in b/l kidneys 2 years ago. She states the L kidney was removed. She states the mass in the right kidney has grown slightly. She has questions about what a tumor conference is. Writer answers her questions to the best of my ability. Pt appreciative of call and information. Will continue to follow.    Electronically signed by Chelsea Hogue RN on 3/12/2024 at 3:26 PM

## 2024-04-05 ENCOUNTER — TELEPHONE (OUTPATIENT)
Dept: ONCOLOGY | Age: 79
End: 2024-04-05

## 2024-04-05 NOTE — TELEPHONE ENCOUNTER
Name: Rut Burnham  : 1945  MRN: [id number]    Oncology Navigation Follow-Up Note    Contact Type:  Telephone    Notes: Writer received a call from pt asking when the IR referral is being placed for her kidney ablation. Writer spoke to Dr. Moeller. Dr. Moeller wants to discuss plan with Dr. Osorio before proceeding forward. Pt updated on plan and was appreciative of update. Will continue to follow.      Electronically signed by Chelsea Hogue RN on 2024 at 12:47 PM

## 2024-04-09 ENCOUNTER — TELEPHONE (OUTPATIENT)
Dept: ONCOLOGY | Age: 79
End: 2024-04-09

## 2024-04-09 NOTE — TELEPHONE ENCOUNTER
Name: Rut Burnham  : 1945  MRN: [id number]    Oncology Navigation Follow-Up Note    Contact Type:  Telephone    Notes: Writer updated pt that Dr. Osorio will be reviewing images to determine if proceeding with kidney ablation through IR is needed. Pt was appreciative of update. Will continue to follow.      Electronically signed by Chelsea Hogue RN on 2024 at 3:25 PM

## 2024-04-12 ENCOUNTER — TELEPHONE (OUTPATIENT)
Dept: ONCOLOGY | Age: 79
End: 2024-04-12

## 2024-04-12 NOTE — TELEPHONE ENCOUNTER
Name: Rut Burnham  : 1945  MRN: [id number]    Oncology Navigation Follow-Up Note    Contact Type:  Telephone    Notes: Writer covering for Chelsea ARCE pt's oncology nurse navigator.  Spoke w/Dr. Moeller to inquire if spoke w/Dr. Osorio.  Dr. Moeller stated spoke w/Dr. Osorio, Dr. Osorio to review imaging & call back.  Dr. Moeller stated awaits call & requested pt contact Dr. Osorio office to schedule f/u.  Pt updated on conversation w/Dr. Moeller.  Pt stated scheduled for Dr. Osorio f/u 4/15 & will call w/outcome.  Dr. Moeller updated.      Electronically signed by Chelsea Joy RN on 2024 at 4:55 PM

## 2024-04-16 ENCOUNTER — TELEPHONE (OUTPATIENT)
Dept: ONCOLOGY | Age: 79
End: 2024-04-16

## 2024-04-16 NOTE — TELEPHONE ENCOUNTER
Name: Rut Burnham  : 1945  MRN: [id number]    Oncology Navigation Follow-Up Note    Contact Type:  Telephone    Notes: Writer covering for Chelsea SPENCER, pt's oncology nurse navigator.  Per Dr. Osorio's 4/15 f/u note, pt referred to  IR for cryoablation of renal mass.  Spoke w/Nita,  IR , to confirm received order.  Nita stated order received,  IR MD okayed to schedule, & will contact pt to schedule consultation.  Spoke w/pt updated on conversation w/Nita.  Notified pt Chelsea SPENCER will & coordinate Dr. Moeller f/u after cryoablation completed.  Pt verbalized understanding & denied questions/concerns.        Electronically signed by Chelsea Joy RN on 2024 at 10:43 AM

## 2024-05-01 ENCOUNTER — TELEPHONE (OUTPATIENT)
Dept: INTERVENTIONAL RADIOLOGY/VASCULAR | Age: 79
End: 2024-05-01

## 2024-05-01 DIAGNOSIS — N28.89 RENAL MASS, RIGHT: ICD-10-CM

## 2024-05-01 DIAGNOSIS — N28.89 OTHER SPECIFIED DISORDERS OF KIDNEY AND URETER: Primary | ICD-10-CM

## 2024-05-02 ENCOUNTER — HOSPITAL ENCOUNTER (OUTPATIENT)
Dept: INTERVENTIONAL RADIOLOGY/VASCULAR | Age: 79
Discharge: HOME | End: 2024-05-04

## 2024-05-02 DIAGNOSIS — N28.89 OTHER SPECIFIED DISORDERS OF KIDNEY AND URETER: ICD-10-CM

## 2024-05-02 DIAGNOSIS — N28.89 RENAL MASS, RIGHT: ICD-10-CM

## 2024-05-13 NOTE — DISCHARGE INSTRUCTIONS
Preoperative Instructions:    Stop eating solid foods at midnight the night prior to surgery.    Stop drinking clear liquids at midnight the night prior to surgery.    Arrive at the surgery center (Entrance B) by 7:00 AM on 5/29/2024  (or as directed by your surgeon's office).    Please stop any blood thinning medications as directed by your surgeon or prescribing physician. Failure to stop certain medications may interfere with your scheduled surgery.    These may include:  Aspirin, Warfarin (Coumadin), Clopidogrel (Plavix), Ibuprofen (Motrin, Advil), Naproxen (Aleve), Meloxicam (Mobic), Celecoxib (Celebrex), Eliquis, Pradaxa, Xarelto, Effient, Fish Oil, Herbal supplements.  Flax seed oil    You may continue the rest of your medications through the night before surgery unless instructed otherwise.    Please take only the following medication(s) the day of surgery with a small sip of water:  Norvasc/amlodipine, coreg/carvedilol, protonix    Please use and bring inhalers the day of surgery, if applies.  Please bring CPAP the day of surgery, if applies.    PLEASE NOTE:  THE ABOVE (IF ANY) DISCONTINUED MEDS MAY ONLY BE FROM   \"CLEANING UP\" THE MED LIST AND WERE NOT ACTUALLY CANCELLED; SEE CHART FOR DETAILS AND ALWAYS CHECK WITH PRESCRIBING PROVIDER BEFORE DISCONTINUING ANY MEDICATIONS        REMINDERS:  ** If you are going home the day of your procedure, you will need a friend or family member to drive you home after your procedure.  Your  must be 18 years of age or older and able to sign off on your discharge instructions.  Taxi cabs or any form of public transportation is not acceptable.    ** It is preferable that the friend or family member stay at the hospital throughout your procedure.  ** If you are going home the same day as your procedure, someone must remain with you for the first 24 hours after your surgery if you receive anesthesia or sedation.  If you do not have someone to stay with you, your  for at home on the day of surgery.  Other children are not permitted in recovery room and we want you to be able to spend time with the patient.  If other arrangements are not available then we suggest that you have a second adult to stay in the waiting room.      If you have any other questions regarding your procedure or the day of surgery, please call [phone], or [phone]

## 2024-05-17 ENCOUNTER — HOSPITAL ENCOUNTER (OUTPATIENT)
Dept: PREADMISSION TESTING | Age: 79
End: 2024-05-17
Payer: MEDICARE

## 2024-05-17 VITALS
TEMPERATURE: 97.2 F | HEART RATE: 69 BPM | DIASTOLIC BLOOD PRESSURE: 72 MMHG | BODY MASS INDEX: 24.92 KG/M2 | WEIGHT: 146 LBS | HEIGHT: 64 IN | OXYGEN SATURATION: 96 % | RESPIRATION RATE: 18 BRPM | SYSTOLIC BLOOD PRESSURE: 130 MMHG

## 2024-05-17 LAB
ANION GAP SERPL CALCULATED.3IONS-SCNC: 14 MMOL/L (ref 9–16)
BUN SERPL-MCNC: 20 MG/DL (ref 8–23)
CHLORIDE SERPL-SCNC: 104 MMOL/L (ref 98–107)
CO2 SERPL-SCNC: 25 MMOL/L (ref 20–31)
CREAT SERPL-MCNC: 1.2 MG/DL (ref 0.5–0.9)
EKG ATRIAL RATE: 64 BPM
EKG P AXIS: 53 DEGREES
EKG P-R INTERVAL: 198 MS
EKG Q-T INTERVAL: 436 MS
EKG QRS DURATION: 120 MS
EKG QTC CALCULATION (BAZETT): 449 MS
EKG R AXIS: 72 DEGREES
EKG T AXIS: 73 DEGREES
EKG VENTRICULAR RATE: 64 BPM
ERYTHROCYTE [DISTWIDTH] IN BLOOD BY AUTOMATED COUNT: 13.2 % (ref 11.8–14.4)
GFR, ESTIMATED: 46 ML/MIN/1.73M2
GLUCOSE SERPL-MCNC: 108 MG/DL (ref 74–99)
HCT VFR BLD AUTO: 41.1 % (ref 36.3–47.1)
HGB BLD-MCNC: 13.1 G/DL (ref 11.9–15.1)
MCH RBC QN AUTO: 27.8 PG (ref 25.2–33.5)
MCHC RBC AUTO-ENTMCNC: 31.9 G/DL (ref 28.4–34.8)
MCV RBC AUTO: 87.3 FL (ref 82.6–102.9)
NRBC BLD-RTO: 0 PER 100 WBC
PLATELET # BLD AUTO: 307 K/UL (ref 138–453)
PMV BLD AUTO: 10.5 FL (ref 8.1–13.5)
POTASSIUM SERPL-SCNC: 4.1 MMOL/L (ref 3.7–5.3)
RBC # BLD AUTO: 4.71 M/UL (ref 3.95–5.11)
SODIUM SERPL-SCNC: 143 MMOL/L (ref 136–145)
WBC OTHER # BLD: 8.2 K/UL (ref 3.5–11.3)

## 2024-05-17 PROCEDURE — 84520 ASSAY OF UREA NITROGEN: CPT

## 2024-05-17 PROCEDURE — 82565 ASSAY OF CREATININE: CPT

## 2024-05-17 PROCEDURE — 93005 ELECTROCARDIOGRAM TRACING: CPT | Performed by: ANESTHESIOLOGY

## 2024-05-17 PROCEDURE — 82947 ASSAY GLUCOSE BLOOD QUANT: CPT

## 2024-05-17 PROCEDURE — 85027 COMPLETE CBC AUTOMATED: CPT

## 2024-05-17 PROCEDURE — 36415 COLL VENOUS BLD VENIPUNCTURE: CPT

## 2024-05-17 PROCEDURE — 80051 ELECTROLYTE PANEL: CPT

## 2024-05-17 ASSESSMENT — PAIN DESCRIPTION - FREQUENCY: FREQUENCY: INTERMITTENT

## 2024-05-17 ASSESSMENT — PAIN DESCRIPTION - LOCATION: LOCATION: HEAD

## 2024-05-17 ASSESSMENT — PAIN DESCRIPTION - DESCRIPTORS: DESCRIPTORS: ACHING

## 2024-05-17 ASSESSMENT — PAIN DESCRIPTION - ONSET: ONSET: GRADUAL

## 2024-05-17 ASSESSMENT — PAIN SCALES - GENERAL: PAINLEVEL_OUTOF10: 1

## 2024-05-17 NOTE — PROGRESS NOTES
Anesthesia Focused Assessment    Hx of anesthesia complications:  no  Family hx of anesthesia complications:  no      Tested positive for Covid-19 in last 8 weeks: no  Upper respiratory infection within the last 4 weeks: no      STOP-BANG Sleep Apnea Questionnaire    SNORE loudly (heard through closed doors)?   No  TIRED, fatigued, sleepy during daytime?    No  OBSERVED stopping breathing during sleep?   No  High blood PRESSURE or being treated?    Yes    BMI over 35?        No  AGE over 50?        Yes  NECK circumference over 16\"?     No  GENDER (male)?       No             Total 2  High risk 5-8  Intermediate risk 3-4  Low risk 0-2    ----------------------------------------------------------------------------------------------------------------------  LALITA                              No  If yes, machine?          DM1                                            No  DM2                   No    Coronary Artery Disease      No  HTN         Yes, on meds  Defib/AICD/Pacemaker               No             Renal Failure                   No but has history of CKD, s/p left nephrectomy  If yes, on dialysis       No    Active smoker?       No  Drinks alcohol?       No  Illicit drugs?        No  Dentition?        benign      Past Medical History:   Diagnosis Date    Anxiety     Arthritis     Elevated blood sugar     denies diabetes    GERD (gastroesophageal reflux disease)     Glaucoma     Hiatal hernia     Hyperlipidemia     Hypertension     IBS (irritable bowel syndrome)     prone to diarrhea    Kidney tumor 2022    tumor on both kidneys-left kedney removed first    Migraine     Numbness     left foot    Periumbilical hernia     Pulmonary nodule     RBBB (right bundle branch block)     Rectal bleeding     dx with crohn disease x1 episode    Renal cancer (HCC)     left s/p left nephrectomy. biopsy right kidney lesion inconclusive?    Under care of service provider 05/17/2024    pcp-shirley lambertOxford-last visit kiah

## 2024-05-17 NOTE — H&P
(ACIDOPHILUS/CITRUS PECTIN) TABS Take 1 tablet by mouth daily (with breakfast)    ProviderMarta MD   Multiple Vitamin (MVI, OPURITY BAND OPTIMIZED, CHEW TAB) Take 1 tablet by mouth daily    ProviderMarta MD     Past Medical History    has a past medical history of Anxiety, Arthritis, Elevated blood sugar, GERD (gastroesophageal reflux disease), Glaucoma, Hiatal hernia, Hyperlipidemia, Hypertension, IBS (irritable bowel syndrome), Kidney tumor, Migraine, Numbness, Periumbilical hernia, Pulmonary nodule, RBBB (right bundle branch block), Rectal bleeding, Renal cancer (HCC), Under care of service provider, Under care of service provider, and Under care of service provider.  Past Surgical History   has a past surgical history that includes Tonsillectomy and adenoidectomy (); Dilation and curettage of uterus (); Appendectomy (); Colonoscopy (); Hysterectomy (); back surgery (); fracture surgery (Left, ); lipoma resection (); Cataract extraction (); total nephrectomy (Left, 2022); partial nephrectomy (Left, 2022); ERCP (); Cholecystectomy, laparoscopic (); and Endoscopy, colon, diagnostic.  Social History   reports that she has never smoked. She has never been exposed to tobacco smoke. She has never used smokeless tobacco.    reports that she does not currently use alcohol.    reports no history of drug use.   Marital Status    Occupation retired   Family History  Family Status   Relation Name Status    Mother      Father      Sister      Brother      Brother      Brother  Alive    Neg Hx  (Not Specified)     family history includes Heart Disease in her brother and brother; High Blood Pressure in her mother; Lung Disease in her brother and brother; Other in her brother and sister; Pneumonia in her father and mother.    Review of Systems:  CONSTITUTIONAL:   negative for fevers, chills, fatigue and malaise     EYES:   negative for double vision, blurred vision and photophobia    HEENT:   negative for tinnitus, epistaxis and sore throat     RESPIRATORY:   negative for cough, shortness of breath, wheezing     CARDIOVASCULAR:   negative for chest pain, palpitations, syncope, edema     GASTROINTESTINAL:   negative for vomiting nausea   GENITOURINARY:   negative for incontinence     MUSCULOSKELETAL:   negative for neck or back pain     NEUROLOGICAL:   Negative for weakness and tingling  negative for headaches and dizziness     PSYCHIATRIC:   negative for anxiety       OBJECTIVE:   VITALS:  height is 1.626 m (5' 4\") and weight is 66.2 kg (146 lb). Her temporal temperature is 97.2 °F (36.2 °C). Her blood pressure is 130/72 and her pulse is 69. Her respiration is 18 and oxygen saturation is 96%.   CONSTITUTIONAL:alert & oriented x 3, no acute distress. Calm and pleasant.  SKIN:  Warm and dry, no rashes to exposed areas of skin.   HEAD:  Normocephalic, atraumatic.   EYES: PERRL.  EOMs intact.   EARS:  Intact and equal bilaterally. Hearing grossly WNL.    NOSE:  Nares patent.  No rhinorrhea   MOUTH/THROAT:  Mucous membranes pink and moist, teeth appear to be intact.   NECK:supple, good ROM.  LUNGS: Respirations even and non-labored. Clear to auscultation bilaterally, no wheezes, rales, or rhonchi.    CARDIOVASCULAR: Regular rate and rhythm, no murmurs.   ABDOMEN: soft, non-tender, non-distended, bowel sounds active x 4   EXTREMITIES: 1+ edema to bilateral lower extremities.  No varicosities to bilateral lower extremities.   NEUROLOGIC: CN II-XII are grossly intact. Gait is smooth.  Testing:   EK2024  Labs pending: drawn 2024   IMPRESSIONS:   Renal mass.  PLANS:   Renal cyroablation.    DANISH Chaudhari CNP  Electronically signed 2024 at 10:35 AM

## 2024-05-29 ENCOUNTER — ANESTHESIA EVENT (OUTPATIENT)
Dept: CT IMAGING | Age: 79
End: 2024-05-29
Payer: MEDICARE

## 2024-05-29 ENCOUNTER — ANESTHESIA (OUTPATIENT)
Dept: CT IMAGING | Age: 79
End: 2024-05-29
Payer: MEDICARE

## 2024-05-29 ENCOUNTER — HOSPITAL ENCOUNTER (OUTPATIENT)
Dept: CT IMAGING | Age: 79
Discharge: HOME | End: 2024-05-31
Payer: MEDICARE

## 2024-05-29 VITALS
RESPIRATION RATE: 16 BRPM | OXYGEN SATURATION: 96 % | HEART RATE: 72 BPM | DIASTOLIC BLOOD PRESSURE: 75 MMHG | SYSTOLIC BLOOD PRESSURE: 137 MMHG | TEMPERATURE: 98.4 F

## 2024-05-29 DIAGNOSIS — C64.2 RENAL CANCER, LEFT (HCC): ICD-10-CM

## 2024-05-29 LAB
ERYTHROCYTE [DISTWIDTH] IN BLOOD BY AUTOMATED COUNT: 13.5 % (ref 11.8–14.4)
GLUCOSE BLD-MCNC: 110 MG/DL (ref 65–105)
HCT VFR BLD AUTO: 38.1 % (ref 36.3–47.1)
HGB BLD-MCNC: 12.5 G/DL (ref 11.9–15.1)
INR PPP: 1
MCH RBC QN AUTO: 28.2 PG (ref 25.2–33.5)
MCHC RBC AUTO-ENTMCNC: 32.8 G/DL (ref 28.4–34.8)
MCV RBC AUTO: 86 FL (ref 82.6–102.9)
NRBC BLD-RTO: 0 PER 100 WBC
PARTIAL THROMBOPLASTIN TIME: 24.9 SEC (ref 23–36.5)
PLATELET # BLD AUTO: 292 K/UL (ref 138–453)
PMV BLD AUTO: 10.2 FL (ref 8.1–13.5)
PROTHROMBIN TIME: 13.4 SEC (ref 11.7–14.9)
RBC # BLD AUTO: 4.43 M/UL (ref 3.95–5.11)
WBC OTHER # BLD: 8.2 K/UL (ref 3.5–11.3)

## 2024-05-29 PROCEDURE — 6360000002 HC RX W HCPCS: Performed by: ANESTHESIOLOGY

## 2024-05-29 PROCEDURE — 2580000003 HC RX 258: Performed by: PHYSICIAN ASSISTANT

## 2024-05-29 PROCEDURE — 6360000002 HC RX W HCPCS

## 2024-05-29 PROCEDURE — 50593 PERC CRYO ABLATE RENAL TUM: CPT

## 2024-05-29 PROCEDURE — 85730 THROMBOPLASTIN TIME PARTIAL: CPT

## 2024-05-29 PROCEDURE — 85027 COMPLETE CBC AUTOMATED: CPT

## 2024-05-29 PROCEDURE — 6360000002 HC RX W HCPCS: Performed by: STUDENT IN AN ORGANIZED HEALTH CARE EDUCATION/TRAINING PROGRAM

## 2024-05-29 PROCEDURE — 36415 COLL VENOUS BLD VENIPUNCTURE: CPT

## 2024-05-29 PROCEDURE — 6360000002 HC RX W HCPCS: Performed by: PHYSICIAN ASSISTANT

## 2024-05-29 PROCEDURE — 82947 ASSAY GLUCOSE BLOOD QUANT: CPT

## 2024-05-29 PROCEDURE — 85610 PROTHROMBIN TIME: CPT

## 2024-05-29 PROCEDURE — 2500000003 HC RX 250 WO HCPCS

## 2024-05-29 RX ORDER — IBUPROFEN 200 MG
200 TABLET ORAL EVERY 4 HOURS PRN
Status: CANCELLED | OUTPATIENT
Start: 2024-05-29

## 2024-05-29 RX ORDER — MIDAZOLAM HYDROCHLORIDE 1 MG/ML
INJECTION INTRAMUSCULAR; INTRAVENOUS PRN
Status: DISCONTINUED | OUTPATIENT
Start: 2024-05-29 | End: 2024-05-29 | Stop reason: SDUPTHER

## 2024-05-29 RX ORDER — PROPOFOL 10 MG/ML
INJECTION, EMULSION INTRAVENOUS CONTINUOUS PRN
Status: DISCONTINUED | OUTPATIENT
Start: 2024-05-29 | End: 2024-05-29 | Stop reason: SDUPTHER

## 2024-05-29 RX ORDER — MIDAZOLAM HYDROCHLORIDE 2 MG/2ML
1 INJECTION, SOLUTION INTRAMUSCULAR; INTRAVENOUS EVERY 10 MIN PRN
OUTPATIENT
Start: 2024-05-29

## 2024-05-29 RX ORDER — METOCLOPRAMIDE HYDROCHLORIDE 5 MG/ML
10 INJECTION INTRAMUSCULAR; INTRAVENOUS
Status: ACTIVE | OUTPATIENT
Start: 2024-05-29 | End: 2024-05-30

## 2024-05-29 RX ORDER — LABETALOL HYDROCHLORIDE 5 MG/ML
10 INJECTION, SOLUTION INTRAVENOUS
Status: DISCONTINUED | OUTPATIENT
Start: 2024-05-29 | End: 2024-06-01 | Stop reason: HOSPADM

## 2024-05-29 RX ORDER — PHENYLEPHRINE HCL IN 0.9% NACL 1 MG/10 ML
SYRINGE (ML) INTRAVENOUS PRN
Status: DISCONTINUED | OUTPATIENT
Start: 2024-05-29 | End: 2024-05-29 | Stop reason: SDUPTHER

## 2024-05-29 RX ORDER — HYDROCODONE BITARTRATE AND ACETAMINOPHEN 5; 325 MG/1; MG/1
1 TABLET ORAL ONCE
Status: DISCONTINUED | OUTPATIENT
Start: 2024-05-29 | End: 2024-06-01 | Stop reason: HOSPADM

## 2024-05-29 RX ORDER — SODIUM CHLORIDE 0.9 % (FLUSH) 0.9 %
5-40 SYRINGE (ML) INJECTION PRN
Status: DISCONTINUED | OUTPATIENT
Start: 2024-05-29 | End: 2024-06-01 | Stop reason: HOSPADM

## 2024-05-29 RX ORDER — SODIUM CHLORIDE 0.9 % (FLUSH) 0.9 %
5-40 SYRINGE (ML) INJECTION EVERY 12 HOURS SCHEDULED
Status: DISCONTINUED | OUTPATIENT
Start: 2024-05-29 | End: 2024-06-01 | Stop reason: HOSPADM

## 2024-05-29 RX ORDER — SODIUM CHLORIDE 9 MG/ML
INJECTION, SOLUTION INTRAVENOUS PRN
Status: DISCONTINUED | OUTPATIENT
Start: 2024-05-29 | End: 2024-06-01 | Stop reason: HOSPADM

## 2024-05-29 RX ORDER — IPRATROPIUM BROMIDE AND ALBUTEROL SULFATE 2.5; .5 MG/3ML; MG/3ML
1 SOLUTION RESPIRATORY (INHALATION)
Status: ACTIVE | OUTPATIENT
Start: 2024-05-29 | End: 2024-05-30

## 2024-05-29 RX ORDER — SODIUM CHLORIDE 0.9 % (FLUSH) 0.9 %
5-40 SYRINGE (ML) INJECTION PRN
OUTPATIENT
Start: 2024-05-29

## 2024-05-29 RX ORDER — SODIUM CHLORIDE 9 MG/ML
INJECTION, SOLUTION INTRAVENOUS PRN
OUTPATIENT
Start: 2024-05-29

## 2024-05-29 RX ORDER — ONDANSETRON 2 MG/ML
INJECTION INTRAMUSCULAR; INTRAVENOUS PRN
Status: DISCONTINUED | OUTPATIENT
Start: 2024-05-29 | End: 2024-05-29 | Stop reason: SDUPTHER

## 2024-05-29 RX ORDER — PROPOFOL 10 MG/ML
INJECTION, EMULSION INTRAVENOUS PRN
Status: DISCONTINUED | OUTPATIENT
Start: 2024-05-29 | End: 2024-05-29 | Stop reason: SDUPTHER

## 2024-05-29 RX ORDER — HYDRALAZINE HYDROCHLORIDE 20 MG/ML
10 INJECTION INTRAMUSCULAR; INTRAVENOUS
Status: DISCONTINUED | OUTPATIENT
Start: 2024-05-29 | End: 2024-06-01 | Stop reason: HOSPADM

## 2024-05-29 RX ORDER — CEFAZOLIN SODIUM 1 G/3ML
INJECTION, POWDER, FOR SOLUTION INTRAMUSCULAR; INTRAVENOUS PRN
Status: DISCONTINUED | OUTPATIENT
Start: 2024-05-29 | End: 2024-05-29 | Stop reason: SDUPTHER

## 2024-05-29 RX ORDER — SODIUM CHLORIDE 9 MG/ML
INJECTION, SOLUTION INTRAVENOUS CONTINUOUS
Status: DISCONTINUED | OUTPATIENT
Start: 2024-05-29 | End: 2024-06-01 | Stop reason: HOSPADM

## 2024-05-29 RX ORDER — PROCHLORPERAZINE EDISYLATE 5 MG/ML
5 INJECTION INTRAMUSCULAR; INTRAVENOUS
Status: DISPENSED | OUTPATIENT
Start: 2024-05-29 | End: 2024-05-30

## 2024-05-29 RX ORDER — LIDOCAINE HYDROCHLORIDE 10 MG/ML
1 INJECTION, SOLUTION INFILTRATION; PERINEURAL
OUTPATIENT
Start: 2024-05-29 | End: 2024-05-30

## 2024-05-29 RX ORDER — FENTANYL CITRATE 50 UG/ML
INJECTION, SOLUTION INTRAMUSCULAR; INTRAVENOUS PRN
Status: DISCONTINUED | OUTPATIENT
Start: 2024-05-29 | End: 2024-05-29 | Stop reason: SDUPTHER

## 2024-05-29 RX ORDER — LIDOCAINE HYDROCHLORIDE 10 MG/ML
INJECTION, SOLUTION EPIDURAL; INFILTRATION; INTRACAUDAL; PERINEURAL PRN
Status: DISCONTINUED | OUTPATIENT
Start: 2024-05-29 | End: 2024-05-29 | Stop reason: SDUPTHER

## 2024-05-29 RX ORDER — NALOXONE HYDROCHLORIDE 0.4 MG/ML
INJECTION, SOLUTION INTRAMUSCULAR; INTRAVENOUS; SUBCUTANEOUS PRN
Status: DISCONTINUED | OUTPATIENT
Start: 2024-05-29 | End: 2024-06-01 | Stop reason: HOSPADM

## 2024-05-29 RX ORDER — ROCURONIUM BROMIDE 10 MG/ML
INJECTION, SOLUTION INTRAVENOUS PRN
Status: DISCONTINUED | OUTPATIENT
Start: 2024-05-29 | End: 2024-05-29 | Stop reason: SDUPTHER

## 2024-05-29 RX ORDER — SODIUM CHLORIDE 0.9 % (FLUSH) 0.9 %
5-40 SYRINGE (ML) INJECTION EVERY 12 HOURS SCHEDULED
OUTPATIENT
Start: 2024-05-29

## 2024-05-29 RX ORDER — ONDANSETRON 2 MG/ML
4 INJECTION INTRAMUSCULAR; INTRAVENOUS
Status: COMPLETED | OUTPATIENT
Start: 2024-05-29 | End: 2024-05-29

## 2024-05-29 RX ADMIN — Medication 100 MCG: at 10:10

## 2024-05-29 RX ADMIN — Medication 100 MCG: at 10:15

## 2024-05-29 RX ADMIN — SODIUM CHLORIDE: 9 INJECTION, SOLUTION INTRAVENOUS at 07:38

## 2024-05-29 RX ADMIN — Medication 100 MCG: at 10:05

## 2024-05-29 RX ADMIN — MIDAZOLAM 2 MG: 1 INJECTION INTRAMUSCULAR; INTRAVENOUS at 09:41

## 2024-05-29 RX ADMIN — ROCURONIUM BROMIDE 40 MG: 10 INJECTION INTRAVENOUS at 09:28

## 2024-05-29 RX ADMIN — HYDROMORPHONE HYDROCHLORIDE 0.5 MG: 1 INJECTION, SOLUTION INTRAMUSCULAR; INTRAVENOUS; SUBCUTANEOUS at 13:29

## 2024-05-29 RX ADMIN — ROCURONIUM BROMIDE 10 MG: 10 INJECTION INTRAVENOUS at 10:30

## 2024-05-29 RX ADMIN — ONDANSETRON 4 MG: 2 INJECTION INTRAMUSCULAR; INTRAVENOUS at 13:57

## 2024-05-29 RX ADMIN — Medication 100 MCG: at 09:59

## 2024-05-29 RX ADMIN — SODIUM CHLORIDE: 9 INJECTION, SOLUTION INTRAVENOUS at 11:10

## 2024-05-29 RX ADMIN — Medication 100 MCG: at 10:03

## 2024-05-29 RX ADMIN — SUGAMMADEX 200 MG: 100 INJECTION, SOLUTION INTRAVENOUS at 12:46

## 2024-05-29 RX ADMIN — CEFAZOLIN 2 G: 1 INJECTION, POWDER, FOR SOLUTION INTRAMUSCULAR; INTRAVENOUS at 09:57

## 2024-05-29 RX ADMIN — ONDANSETRON 4 MG: 2 INJECTION INTRAMUSCULAR; INTRAVENOUS at 12:31

## 2024-05-29 RX ADMIN — HYDROMORPHONE HYDROCHLORIDE 0.5 MG: 1 INJECTION, SOLUTION INTRAMUSCULAR; INTRAVENOUS; SUBCUTANEOUS at 13:13

## 2024-05-29 RX ADMIN — PROPOFOL 200 MCG/KG/MIN: 10 INJECTION, EMULSION INTRAVENOUS at 09:32

## 2024-05-29 RX ADMIN — LIDOCAINE HYDROCHLORIDE 50 MG: 10 INJECTION, SOLUTION EPIDURAL; INFILTRATION; INTRACAUDAL; PERINEURAL at 09:28

## 2024-05-29 RX ADMIN — FENTANYL CITRATE 50 MCG: 50 INJECTION, SOLUTION INTRAMUSCULAR; INTRAVENOUS at 09:28

## 2024-05-29 RX ADMIN — WATER 1000 MG: 1 INJECTION INTRAMUSCULAR; INTRAVENOUS; SUBCUTANEOUS at 08:40

## 2024-05-29 RX ADMIN — PROPOFOL 130 MCG/KG/MIN: 10 INJECTION, EMULSION INTRAVENOUS at 10:50

## 2024-05-29 RX ADMIN — PROPOFOL 150 MG: 10 INJECTION, EMULSION INTRAVENOUS at 09:28

## 2024-05-29 RX ADMIN — ROCURONIUM BROMIDE 10 MG: 10 INJECTION INTRAVENOUS at 11:22

## 2024-05-29 ASSESSMENT — PAIN SCALES - GENERAL
PAINLEVEL_OUTOF10: 2
PAINLEVEL_OUTOF10: 8
PAINLEVEL_OUTOF10: 5
PAINLEVEL_OUTOF10: 8
PAINLEVEL_OUTOF10: 7
PAINLEVEL_OUTOF10: 8

## 2024-05-29 ASSESSMENT — PAIN DESCRIPTION - LOCATION
LOCATION: FLANK

## 2024-05-29 ASSESSMENT — PAIN DESCRIPTION - ORIENTATION
ORIENTATION: RIGHT

## 2024-05-29 ASSESSMENT — PAIN DESCRIPTION - DESCRIPTORS
DESCRIPTORS: ACHING;DULL
DESCRIPTORS: ACHING
DESCRIPTORS: ACHING;DULL

## 2024-05-29 ASSESSMENT — PAIN - FUNCTIONAL ASSESSMENT: PAIN_FUNCTIONAL_ASSESSMENT: 0-10

## 2024-05-29 NOTE — BRIEF OP NOTE
Brief Postoperative Note    Rut Burnham  YOB: 1945  [id number]    Pre-operative Diagnosis: Solitary rt kidney    Post-operative Diagnosis: Same    Procedure: Cryoablation    Medications Given: general anesthesia    Anesthesia: Local    Surgeons/Assistants: CARMINE LOUISE MD    Estimated Blood Loss: minimal    Complications: none    Specimens: were not obtained    Findings: Rt renal tumor cryo-ablated under CT and US with 4 probes with satisfactory results.      Electronically signed by CARMINE LOUISE MD on 5/29/2024 at 12:47 PM

## 2024-05-29 NOTE — ANESTHESIA PRE PROCEDURE
02/28/2024 08:04 AM    ALT 11 02/28/2024 08:04 AM       POC Tests: No results for input(s): \"POCGLU\", \"POCNA\", \"POCK\", \"POCCL\", \"POCBUN\", \"POCHEMO\", \"POCHCT\" in the last 72 hours.    Coags: No results found for: \"PROTIME\", \"INR\", \"APTT\"    HCG (If Applicable): No results found for: \"PREGTESTUR\", \"PREGSERUM\", \"HCG\", \"HCGQUANT\"     ABGs: No results found for: \"PHART\", \"PO2ART\", \"YUY7OTB\", \"YFW3KSF\", \"BEART\", \"A0FUZXJI\"     Type & Screen (If Applicable):  No results found for: \"LABABO\"    Drug/Infectious Status (If Applicable):  No results found for: \"HIV\", \"HEPCAB\"    COVID-19 Screening (If Applicable): No results found for: \"COVID19\"        Anesthesia Evaluation  Patient summary reviewed   no history of anesthetic complications:   Airway: Mallampati: II  TM distance: >3 FB   Neck ROM: full  Mouth opening: > = 3 FB   Dental:          Pulmonary:Negative Pulmonary ROS and normal exam                               Cardiovascular:    (+) hypertension: no interval change      ECG reviewed  Rhythm: regular  Rate: normal                    Neuro/Psych:   (+) neuromuscular disease:, headaches: migraine headaches            GI/Hepatic/Renal:   (+) hiatal hernia, GERD: no interval change          Endo/Other:    (+) DiabetesType II DM, no interval change.                 Abdominal: normal exam            Vascular:          Other Findings:       Anesthesia Plan      general     ASA 2       Induction: intravenous.      Anesthetic plan and risks discussed with patient.      Plan discussed with CRNA.                Geronimo Torrez MD   5/29/2024

## 2024-05-29 NOTE — H&P
grossly intact. Gait is smooth.  Testing:   EK2024  Labs pending: drawn 2024   IMPRESSIONS:   Renal mass.  PLANS:   Renal cyroablation.     DANISH Chaudhari CNP  Electronically signed 2024 at 10:35 AM

## 2024-05-30 NOTE — ANESTHESIA POSTPROCEDURE EVALUATION
Department of Anesthesiology  Postprocedure Note    Patient: Rut Burnham  MRN: [id number]  YOB: 1945  Date of evaluation: 5/30/2024    Procedure Summary       Date: 05/29/24 Room / Location: [facility] CT Scan; [facility] Special Procedures    Anesthesia Start: 0918 Anesthesia Stop: 1259    Procedure: CT CRYO ABLATION RENAL TUMOR Diagnosis: Renal cancer, left (HCC)    Scheduled Providers: Radiologist, Brigida Interventional; Joselo Gomes APRN - CRNA Responsible Provider: Geronimo Torrez MD    Anesthesia Type: general ASA Status: 2            Anesthesia Type: No value filed.    Cecilia Phase I: Cecilia Score: 10    Cecilia Phase II: Cecilia Score: 10    Anesthesia Post Evaluation    Patient location during evaluation: PACU  Patient participation: complete - patient participated  Level of consciousness: awake and alert  Pain score: 0  Airway patency: patent  Nausea & Vomiting: no vomiting and no nausea  Cardiovascular status: hemodynamically stable  Respiratory status: acceptable  Hydration status: stable  Pain management: adequate    No notable events documented.

## 2024-05-31 ENCOUNTER — OFFICE VISIT (OUTPATIENT)
Age: 79
End: 2024-05-31
Payer: MEDICARE

## 2024-05-31 VITALS
WEIGHT: 146.6 LBS | DIASTOLIC BLOOD PRESSURE: 68 MMHG | OXYGEN SATURATION: 93 % | SYSTOLIC BLOOD PRESSURE: 110 MMHG | HEIGHT: 64 IN | TEMPERATURE: 97.3 F | BODY MASS INDEX: 25.03 KG/M2 | HEART RATE: 77 BPM

## 2024-05-31 DIAGNOSIS — E78.5 DYSLIPIDEMIA: ICD-10-CM

## 2024-05-31 DIAGNOSIS — E55.9 VITAMIN D DEFICIENCY: Primary | ICD-10-CM

## 2024-05-31 DIAGNOSIS — R53.83 FATIGUE, UNSPECIFIED TYPE: ICD-10-CM

## 2024-05-31 DIAGNOSIS — N18.31 STAGE 3A CHRONIC KIDNEY DISEASE (HCC): ICD-10-CM

## 2024-05-31 DIAGNOSIS — E11.9 TYPE 2 DIABETES MELLITUS WITHOUT COMPLICATION, WITHOUT LONG-TERM CURRENT USE OF INSULIN (HCC): ICD-10-CM

## 2024-05-31 DIAGNOSIS — R11.0 NAUSEA: ICD-10-CM

## 2024-05-31 DIAGNOSIS — I10 ESSENTIAL HYPERTENSION: ICD-10-CM

## 2024-05-31 PROCEDURE — 3074F SYST BP LT 130 MM HG: CPT | Performed by: FAMILY MEDICINE

## 2024-05-31 PROCEDURE — 3078F DIAST BP <80 MM HG: CPT | Performed by: FAMILY MEDICINE

## 2024-05-31 PROCEDURE — 99213 OFFICE O/P EST LOW 20 MIN: CPT | Performed by: FAMILY MEDICINE

## 2024-05-31 PROCEDURE — 1123F ACP DISCUSS/DSCN MKR DOCD: CPT | Performed by: FAMILY MEDICINE

## 2024-05-31 NOTE — PROGRESS NOTES
[facility]     Date of Visit:  2024  Patient Name: Rut Burnham   Patient :  1945     CHIEF COMPLAINT/HPI:     Rut Burnham is a 79 y.o. female who presents today for an general visit to be evaluated for the following condition(s):  Chief Complaint   Patient presents with    Diabetes     She is here for her regular check up on  her diabetes without labs.  She also had a cryo ablation on her right kidney on the  and she is in a lot of pain.  They did not give her anything for the pain.    Patient here for follow-up after recent hospitalization for cryoablation right kidney lesion.  She has already had left kidney removed for kidney cancer.  The lesion in right kidney was getting bigger in size so decision was made to have it cryoablated.  She is feeling sore and achy since that time, not very good appetite but no vomiting fevers chills.  Decreased BMs.  She is taking Tylenol for pain.  She saw nephrology recently for follow-up and no medication changes.  No chest pain or shortness of breath    REVIEW OF SYSTEM      Review of Systems   Constitutional:  Positive for appetite change and fatigue. Negative for chills, fever and unexpected weight change.   HENT:  Negative for sore throat.    Respiratory:  Negative for cough and shortness of breath.    Cardiovascular:  Negative for chest pain.   Gastrointestinal:  Positive for constipation. Negative for abdominal distention, abdominal pain and nausea.   Genitourinary:  Negative for difficulty urinating and hematuria.   Neurological:  Negative for weakness and headaches.   All other systems reviewed and are negative.        REVIEWED INFORMATION      Allergies   Allergen Reactions    Latex Rash    Adhesive Tape Rash    Codeine Nausea Only and Rash    Epoetin Beta Rash    Erythromycin Rash and Other (See Comments)     diarrhea    Hydrochlorothiazide Rash    Lisinopril Other (See Comments)     Due to kidney disease

## 2024-06-05 ENCOUNTER — HOSPITAL ENCOUNTER (OUTPATIENT)
Age: 79
Setting detail: SPECIMEN
Discharge: HOME | End: 2024-06-05

## 2024-06-05 LAB
25(OH)D3 SERPL-MCNC: 76.2 NG/ML (ref 30–100)
ANION GAP SERPL CALCULATED.3IONS-SCNC: 13 MMOL/L (ref 9–16)
BACTERIA URNS QL MICRO: NORMAL
BILIRUB UR QL STRIP: NEGATIVE
BUN SERPL-MCNC: 19 MG/DL (ref 8–23)
CALCIUM SERPL-MCNC: 9.9 MG/DL (ref 8.6–10.4)
CASTS #/AREA URNS LPF: NORMAL /LPF (ref 0–8)
CHLORIDE SERPL-SCNC: 102 MMOL/L (ref 98–107)
CLARITY UR: CLEAR
CO2 SERPL-SCNC: 28 MMOL/L (ref 20–31)
COLOR UR: YELLOW
CREAT SERPL-MCNC: 1.4 MG/DL (ref 0.5–0.9)
CREAT UR-MCNC: 57.4 MG/DL (ref 28–217)
EPI CELLS #/AREA URNS HPF: NORMAL /HPF (ref 0–5)
ERYTHROCYTE [DISTWIDTH] IN BLOOD BY AUTOMATED COUNT: 13.6 % (ref 11.8–14.4)
GFR, ESTIMATED: 39 ML/MIN/1.73M2
GLUCOSE SERPL-MCNC: 110 MG/DL (ref 74–99)
GLUCOSE UR STRIP-MCNC: NEGATIVE MG/DL
HCT VFR BLD AUTO: 39.1 % (ref 36.3–47.1)
HGB BLD-MCNC: 12.4 G/DL (ref 11.9–15.1)
HGB UR QL STRIP.AUTO: NEGATIVE
KETONES UR STRIP-MCNC: NEGATIVE MG/DL
LEUKOCYTE ESTERASE UR QL STRIP: ABNORMAL
MAGNESIUM SERPL-MCNC: 2.5 MG/DL (ref 1.6–2.4)
MCH RBC QN AUTO: 28 PG (ref 25.2–33.5)
MCHC RBC AUTO-ENTMCNC: 31.7 G/DL (ref 28.4–34.8)
MCV RBC AUTO: 88.3 FL (ref 82.6–102.9)
NITRITE UR QL STRIP: NEGATIVE
NRBC BLD-RTO: 0 PER 100 WBC
PH UR STRIP: 7.5 [PH] (ref 5–8)
PHOSPHATE SERPL-MCNC: 4 MG/DL (ref 2.5–4.5)
PLATELET # BLD AUTO: 361 K/UL (ref 138–453)
PMV BLD AUTO: 9.9 FL (ref 8.1–13.5)
POTASSIUM SERPL-SCNC: 4.3 MMOL/L (ref 3.7–5.3)
PROT UR STRIP-MCNC: NEGATIVE MG/DL
PTH-INTACT SERPL-MCNC: 33 PG/ML (ref 15–65)
RBC # BLD AUTO: 4.43 M/UL (ref 3.95–5.11)
RBC #/AREA URNS HPF: NORMAL /HPF (ref 0–4)
SODIUM SERPL-SCNC: 143 MMOL/L (ref 136–145)
SP GR UR STRIP: 1.01 (ref 1–1.03)
TOTAL PROTEIN, URINE: 9 MG/DL
URINE TOTAL PROTEIN CREATININE RATIO: 0.16
UROBILINOGEN UR STRIP-ACNC: NORMAL EU/DL (ref 0–1)
WBC #/AREA URNS HPF: NORMAL /HPF (ref 0–5)
WBC OTHER # BLD: 9.1 K/UL (ref 3.5–11.3)

## 2024-06-05 ASSESSMENT — ENCOUNTER SYMPTOMS
NAUSEA: 0
SHORTNESS OF BREATH: 0
SORE THROAT: 0
COUGH: 0
CONSTIPATION: 1
ABDOMINAL PAIN: 0
ABDOMINAL DISTENTION: 0

## 2024-06-06 NOTE — ASSESSMENT & PLAN NOTE
reviewed recent nonfasting blood sugar is well-controlled, last hemoglobin A1c in the fall was 5.7, check labs before next appointment, this remains diet controlled

## 2024-06-06 NOTE — ASSESSMENT & PLAN NOTE
blood pressure well controlled on carvedilol, amlodipine, continue, reviewed renal function stable with creatinine 1.2, GFR stable 46 on 5/17/24, recent urine protein to creatinine ratio normal as well.  PTH normal CBC normal and vitamin D 75/nORMAL . Continue to follow with nephrology

## 2024-06-06 NOTE — ASSESSMENT & PLAN NOTE
reviewed renal function stable with creatinine 1.2, GFR stable 46 on 5/17/24, recent urine protein to creatinine ratio normal as well.  PTH normal CBC normal and vitamin D 75/nORMAL . Continue to follow with nephrology

## 2024-06-06 NOTE — ASSESSMENT & PLAN NOTE
postoperative, bland diet, take stool softeners for constipation, ambulation, adequate hydration

## 2024-07-08 ENCOUNTER — HOSPITAL ENCOUNTER (OUTPATIENT)
Dept: CT IMAGING | Age: 79
Discharge: HOME | End: 2024-07-10
Attending: UROLOGY
Payer: MEDICARE

## 2024-07-08 DIAGNOSIS — C64.2 MALIGNANT NEOPLASM OF LEFT KIDNEY, EXCEPT RENAL PELVIS (HCC): ICD-10-CM

## 2024-07-08 PROCEDURE — 6360000004 HC RX CONTRAST MEDICATION: Performed by: UROLOGY

## 2024-07-08 PROCEDURE — 74178 CT ABD&PLV WO CNTR FLWD CNTR: CPT

## 2024-07-08 PROCEDURE — 2500000003 HC RX 250 WO HCPCS: Performed by: UROLOGY

## 2024-07-08 RX ADMIN — BARIUM SULFATE 450 ML: 20 SUSPENSION ORAL at 10:52

## 2024-07-08 RX ADMIN — IOPAMIDOL 75 ML: 755 INJECTION, SOLUTION INTRAVENOUS at 10:52

## 2024-07-08 NOTE — PROCEDURES
Pt informed writer that she was to receive pre and post hydration for her ct scan. I called ordering physician office(Dr. Osorio) and spoke with rn from the office. A [facility] np [place] made a note from her encounter about ordering hydration for any ct scans the pt has. When the pt scheduled [facility] with [facility] the hydration order was NOT scheduled in coordination. In speaking with the rn from Dr Nicolas office we decided it to be best to send the pt home to reschedule even though she drank oral contrast already. The pt was very upset by this and stated that she has had nothing but problems with her [facility] and [facility] lack of coordination. She stated that she did not want to come back and do this again and requested to have the scan done anyway. I proceeded at the pts request to perform her ct scan as she verbally waived having hydration.

## 2024-07-15 ENCOUNTER — HOSPITAL ENCOUNTER (OUTPATIENT)
Age: 79
Setting detail: SPECIMEN
Discharge: HOME | End: 2024-07-15

## 2024-07-15 LAB
ANION GAP SERPL CALCULATED.3IONS-SCNC: 14 MMOL/L (ref 9–16)
BUN SERPL-MCNC: 20 MG/DL (ref 8–23)
CALCIUM SERPL-MCNC: 9.7 MG/DL (ref 8.6–10.4)
CHLORIDE SERPL-SCNC: 102 MMOL/L (ref 98–107)
CO2 SERPL-SCNC: 25 MMOL/L (ref 20–31)
CREAT SERPL-MCNC: 1.4 MG/DL (ref 0.5–0.9)
ERYTHROCYTE [DISTWIDTH] IN BLOOD BY AUTOMATED COUNT: 13.9 % (ref 11.8–14.4)
GFR, ESTIMATED: 39 ML/MIN/1.73M2
GLUCOSE SERPL-MCNC: 142 MG/DL (ref 74–99)
HCT VFR BLD AUTO: 40.7 % (ref 36.3–47.1)
HGB BLD-MCNC: 12.8 G/DL (ref 11.9–15.1)
MCH RBC QN AUTO: 27.5 PG (ref 25.2–33.5)
MCHC RBC AUTO-ENTMCNC: 31.4 G/DL (ref 28.4–34.8)
MCV RBC AUTO: 87.3 FL (ref 82.6–102.9)
NRBC BLD-RTO: 0 PER 100 WBC
PLATELET # BLD AUTO: 324 K/UL (ref 138–453)
PMV BLD AUTO: 10.5 FL (ref 8.1–13.5)
POTASSIUM SERPL-SCNC: 4.2 MMOL/L (ref 3.7–5.3)
RBC # BLD AUTO: 4.66 M/UL (ref 3.95–5.11)
SODIUM SERPL-SCNC: 141 MMOL/L (ref 136–145)
WBC OTHER # BLD: 7.6 K/UL (ref 3.5–11.3)

## 2024-07-21 DIAGNOSIS — I10 ESSENTIAL HYPERTENSION: ICD-10-CM

## 2024-07-22 NOTE — TELEPHONE ENCOUNTER
Rut Burnham is calling to request a refill on the following medication(s):    Medication Request:  Requested Prescriptions     Pending Prescriptions Disp Refills    carvedilol (COREG) 12.5 MG tablet [Pharmacy Med Name: CARVEDILOL 12.5 MG TABLET] 180 tablet 1     Sig: TAKE 1 TABLET BY MOUTH TWICE A DAY WITH A MEAL    pantoprazole (PROTONIX) 40 MG tablet [Pharmacy Med Name: PANTOPRAZOLE SOD DR 40 MG TAB] 180 tablet 1     Sig: TAKE 1 TABLET BY MOUTH TWICE A DAY       Last Visit Date (If Applicable):  5/31/2024    Next Visit Date:    12/3/2024

## 2024-07-23 RX ORDER — CARVEDILOL 12.5 MG/1
TABLET ORAL
Qty: 180 TABLET | Refills: 1 | Status: SHIPPED | OUTPATIENT
Start: 2024-07-23

## 2024-07-23 RX ORDER — PANTOPRAZOLE SODIUM 40 MG/1
40 TABLET, DELAYED RELEASE ORAL 2 TIMES DAILY
Qty: 180 TABLET | Refills: 1 | Status: SHIPPED | OUTPATIENT
Start: 2024-07-23

## 2024-07-25 ENCOUNTER — TELEPHONE (OUTPATIENT)
Dept: ONCOLOGY | Age: 79
End: 2024-07-25

## 2024-07-25 NOTE — TELEPHONE ENCOUNTER
Name: Rut Burnham  : 1945  MRN: [id number]    Oncology Navigation Follow-Up Note    Contact Type:  Telephone    Notes: Writer called pt and provided her f/u with Dr. Moeller for 24 at 8am at [facility]. Pt had cryoablation in May for her renal mass. Pt appreciative of f/u call and support.       Electronically signed by Chelsea Hogue RN on 2024 at 9:53 AM

## 2024-07-31 ENCOUNTER — TELEPHONE (OUTPATIENT)
Dept: ONCOLOGY | Age: 79
End: 2024-07-31

## 2024-07-31 ENCOUNTER — OFFICE VISIT (OUTPATIENT)
Dept: ONCOLOGY | Age: 79
End: 2024-07-31
Payer: MEDICARE

## 2024-07-31 VITALS
TEMPERATURE: 97.3 F | DIASTOLIC BLOOD PRESSURE: 69 MMHG | SYSTOLIC BLOOD PRESSURE: 104 MMHG | BODY MASS INDEX: 24.84 KG/M2 | HEART RATE: 74 BPM | RESPIRATION RATE: 18 BRPM | WEIGHT: 144.7 LBS | OXYGEN SATURATION: 93 %

## 2024-07-31 DIAGNOSIS — C64.2 CANCER OF LEFT KIDNEY (HCC): ICD-10-CM

## 2024-07-31 DIAGNOSIS — C64.1 PRIMARY MALIGNANT NEOPLASM OF RIGHT KIDNEY WITH METASTASIS FROM KIDNEY TO OTHER SITE (HCC): Primary | ICD-10-CM

## 2024-07-31 DIAGNOSIS — N18.30 STAGE 3 CHRONIC KIDNEY DISEASE, UNSPECIFIED WHETHER STAGE 3A OR 3B CKD (HCC): ICD-10-CM

## 2024-07-31 PROCEDURE — 1123F ACP DISCUSS/DSCN MKR DOCD: CPT | Performed by: INTERNAL MEDICINE

## 2024-07-31 PROCEDURE — 3078F DIAST BP <80 MM HG: CPT | Performed by: INTERNAL MEDICINE

## 2024-07-31 PROCEDURE — 99211 OFF/OP EST MAY X REQ PHY/QHP: CPT | Performed by: INTERNAL MEDICINE

## 2024-07-31 PROCEDURE — 99214 OFFICE O/P EST MOD 30 MIN: CPT | Performed by: INTERNAL MEDICINE

## 2024-07-31 PROCEDURE — 3074F SYST BP LT 130 MM HG: CPT | Performed by: INTERNAL MEDICINE

## 2024-07-31 NOTE — TELEPHONE ENCOUNTER
Instructions   from Dr. Tyson Moeller MD     chloe and and cxr in early septemebr   Rv after us and cxr     Patient scheduled for US 09/04/2024 at 8:30 am at [facility].  Patient provided xray order.  Patient scheduled for f/u 09/11/2024 at 9:00 am.

## 2024-07-31 NOTE — PROGRESS NOTES
clinical data.  Reviewed records from outside facility reviewed previous medical record  Patient underwent cryoablation overall tolerated well  CT scans show posttreatment changes no evidence of regional or distant metastasis in the abdomen or pelvis  Reassured patient overall good prognosis with patient remains anxious.  Requesting imaging to be done before her planned CT scan and end of December.  I will order ultrasound kidney and chest x-ray  Continue surveillance for kidney cancer.  Creatinine stable.  Discussed with patient historically risk of metastatic disease is low as long as tumor remains less than 3 cm in size.(DISSRM registry)  Patient had multiple questions which answered to the best my ability.    DANNA ACOSTA MD      This note is created with the assistance of a speech recognition program.  While intending to generate a document that actually reflects the content of the visit, the document can still have some errors including those of syntax and sound a like substitutions which may escape proof reading.  It such instances, actual meaning can be extrapolated by contextual diversion.

## 2024-08-08 ENCOUNTER — TELEPHONE (OUTPATIENT)
Dept: ONCOLOGY | Age: 79
End: 2024-08-08

## 2024-08-08 NOTE — TELEPHONE ENCOUNTER
Name: Rut Burnham  : 1945  MRN: [id number]    Oncology Navigation Follow-Up Note    Contact Type:  Telephone    Notes: Writer called pt to touch base with her regarding her recent visit with Dr. Moeller. Writer informed her I was aware of plan to have US, xray and f/u in September. Writer informed her to reach out to me with any needs, if not, I would f/u with her after above tests and f/u is complete. Pt appreciative of call. Will continue to follow.      Electronically signed by Chelsae Hogue RN on 2024 at 2:46 PM

## 2024-09-04 ENCOUNTER — HOSPITAL ENCOUNTER (OUTPATIENT)
Dept: ULTRASOUND IMAGING | Age: 79
Discharge: HOME | End: 2024-09-06
Attending: INTERNAL MEDICINE
Payer: MEDICARE

## 2024-09-04 ENCOUNTER — HOSPITAL ENCOUNTER (OUTPATIENT)
Age: 79
Discharge: HOME | End: 2024-09-06
Attending: INTERNAL MEDICINE
Payer: MEDICARE

## 2024-09-04 ENCOUNTER — HOSPITAL ENCOUNTER (OUTPATIENT)
Dept: GENERAL RADIOLOGY | Age: 79
Discharge: HOME | End: 2024-09-06
Attending: INTERNAL MEDICINE
Payer: MEDICARE

## 2024-09-04 DIAGNOSIS — C64.2 CANCER OF LEFT KIDNEY (HCC): ICD-10-CM

## 2024-09-04 DIAGNOSIS — N18.30 STAGE 3 CHRONIC KIDNEY DISEASE, UNSPECIFIED WHETHER STAGE 3A OR 3B CKD (HCC): ICD-10-CM

## 2024-09-04 DIAGNOSIS — C64.1 PRIMARY MALIGNANT NEOPLASM OF RIGHT KIDNEY WITH METASTASIS FROM KIDNEY TO OTHER SITE (HCC): ICD-10-CM

## 2024-09-04 PROCEDURE — 76775 US EXAM ABDO BACK WALL LIM: CPT

## 2024-09-04 PROCEDURE — 71045 X-RAY EXAM CHEST 1 VIEW: CPT

## 2024-09-11 ENCOUNTER — TELEPHONE (OUTPATIENT)
Dept: ONCOLOGY | Age: 79
End: 2024-09-11

## 2024-09-11 ENCOUNTER — OFFICE VISIT (OUTPATIENT)
Dept: ONCOLOGY | Age: 79
End: 2024-09-11
Payer: MEDICARE

## 2024-09-11 VITALS
DIASTOLIC BLOOD PRESSURE: 54 MMHG | TEMPERATURE: 97.8 F | WEIGHT: 145.8 LBS | OXYGEN SATURATION: 94 % | RESPIRATION RATE: 18 BRPM | HEART RATE: 71 BPM | SYSTOLIC BLOOD PRESSURE: 123 MMHG | BODY MASS INDEX: 25.03 KG/M2

## 2024-09-11 DIAGNOSIS — N18.30 STAGE 3 CHRONIC KIDNEY DISEASE, UNSPECIFIED WHETHER STAGE 3A OR 3B CKD (HCC): ICD-10-CM

## 2024-09-11 DIAGNOSIS — C64.2 RENAL CELL CANCER, LEFT (HCC): Primary | ICD-10-CM

## 2024-09-11 DIAGNOSIS — C64.2 CANCER OF LEFT KIDNEY (HCC): ICD-10-CM

## 2024-09-11 PROCEDURE — 3078F DIAST BP <80 MM HG: CPT | Performed by: INTERNAL MEDICINE

## 2024-09-11 PROCEDURE — 99211 OFF/OP EST MAY X REQ PHY/QHP: CPT | Performed by: INTERNAL MEDICINE

## 2024-09-11 PROCEDURE — 99214 OFFICE O/P EST MOD 30 MIN: CPT | Performed by: INTERNAL MEDICINE

## 2024-09-11 PROCEDURE — 1123F ACP DISCUSS/DSCN MKR DOCD: CPT | Performed by: INTERNAL MEDICINE

## 2024-09-11 PROCEDURE — 3074F SYST BP LT 130 MM HG: CPT | Performed by: INTERNAL MEDICINE

## 2024-10-16 ENCOUNTER — HOSPITAL ENCOUNTER (OUTPATIENT)
Age: 79
Setting detail: SPECIMEN
Discharge: HOME | End: 2024-10-16

## 2024-10-16 LAB
ANION GAP SERPL CALCULATED.3IONS-SCNC: 13 MMOL/L (ref 9–16)
BUN SERPL-MCNC: 23 MG/DL (ref 8–23)
CALCIUM SERPL-MCNC: 10.1 MG/DL (ref 8.6–10.4)
CHLORIDE SERPL-SCNC: 101 MMOL/L (ref 98–107)
CO2 SERPL-SCNC: 27 MMOL/L (ref 20–31)
CREAT SERPL-MCNC: 1.3 MG/DL (ref 0.5–0.9)
ERYTHROCYTE [DISTWIDTH] IN BLOOD BY AUTOMATED COUNT: 13.8 % (ref 11.8–14.4)
GFR, ESTIMATED: 43 ML/MIN/1.73M2
GLUCOSE SERPL-MCNC: 113 MG/DL (ref 74–99)
HCT VFR BLD AUTO: 43 % (ref 36.3–47.1)
HGB BLD-MCNC: 13.7 G/DL (ref 11.9–15.1)
MAGNESIUM SERPL-MCNC: 2.3 MG/DL (ref 1.6–2.4)
MCH RBC QN AUTO: 27.9 PG (ref 25.2–33.5)
MCHC RBC AUTO-ENTMCNC: 31.9 G/DL (ref 28.4–34.8)
MCV RBC AUTO: 87.6 FL (ref 82.6–102.9)
NRBC BLD-RTO: 0 PER 100 WBC
PLATELET # BLD AUTO: 305 K/UL (ref 138–453)
PMV BLD AUTO: 10.7 FL (ref 8.1–13.5)
POTASSIUM SERPL-SCNC: 4.3 MMOL/L (ref 3.7–5.3)
RBC # BLD AUTO: 4.91 M/UL (ref 3.95–5.11)
SODIUM SERPL-SCNC: 141 MMOL/L (ref 136–145)
WBC OTHER # BLD: 8 K/UL (ref 3.5–11.3)

## 2024-11-27 ENCOUNTER — HOSPITAL ENCOUNTER (OUTPATIENT)
Age: 79
Setting detail: SPECIMEN
Discharge: HOME | End: 2024-11-27

## 2024-11-27 DIAGNOSIS — E55.9 VITAMIN D DEFICIENCY: ICD-10-CM

## 2024-11-27 DIAGNOSIS — E11.9 TYPE 2 DIABETES MELLITUS WITHOUT COMPLICATION, WITHOUT LONG-TERM CURRENT USE OF INSULIN (HCC): ICD-10-CM

## 2024-11-27 DIAGNOSIS — R53.83 FATIGUE, UNSPECIFIED TYPE: ICD-10-CM

## 2024-11-27 DIAGNOSIS — E78.5 DYSLIPIDEMIA: ICD-10-CM

## 2024-11-27 DIAGNOSIS — I10 ESSENTIAL HYPERTENSION: ICD-10-CM

## 2024-11-27 DIAGNOSIS — N18.31 STAGE 3A CHRONIC KIDNEY DISEASE (HCC): ICD-10-CM

## 2024-11-27 LAB
25(OH)D3 SERPL-MCNC: 69.2 NG/ML (ref 30–100)
ALBUMIN SERPL-MCNC: 4.4 G/DL (ref 3.5–5.2)
ALBUMIN/GLOB SERPL: 1.5 {RATIO} (ref 1–2.5)
ALP SERPL-CCNC: 106 U/L (ref 35–104)
ALT SERPL-CCNC: 16 U/L (ref 10–35)
ANION GAP SERPL CALCULATED.3IONS-SCNC: 13 MMOL/L (ref 9–16)
AST SERPL-CCNC: 23 U/L (ref 10–35)
BACTERIA URNS QL MICRO: ABNORMAL
BASOPHILS # BLD: 0.05 K/UL (ref 0–0.2)
BASOPHILS NFR BLD: 1 % (ref 0–2)
BILIRUB DIRECT SERPL-MCNC: 0.2 MG/DL (ref 0–0.2)
BILIRUB INDIRECT SERPL-MCNC: 0.3 MG/DL (ref 0–1)
BILIRUB SERPL-MCNC: 0.5 MG/DL (ref 0–1.2)
BILIRUB UR QL STRIP: NEGATIVE
BUN SERPL-MCNC: 21 MG/DL (ref 8–23)
CALCIUM SERPL-MCNC: 9.3 MG/DL (ref 8.6–10.4)
CASTS #/AREA URNS LPF: ABNORMAL /LPF (ref 0–8)
CHLORIDE SERPL-SCNC: 101 MMOL/L (ref 98–107)
CHOLEST SERPL-MCNC: 149 MG/DL (ref 0–199)
CHOLESTEROL/HDL RATIO: 4.4
CLARITY UR: CLEAR
CO2 SERPL-SCNC: 25 MMOL/L (ref 20–31)
COLOR UR: YELLOW
CREAT SERPL-MCNC: 1.3 MG/DL (ref 0.6–0.9)
EOSINOPHIL # BLD: 0.21 K/UL (ref 0–0.44)
EOSINOPHILS RELATIVE PERCENT: 3 % (ref 1–4)
EPI CELLS #/AREA URNS HPF: ABNORMAL /HPF (ref 0–5)
ERYTHROCYTE [DISTWIDTH] IN BLOOD BY AUTOMATED COUNT: 13.7 % (ref 11.8–14.4)
EST. AVERAGE GLUCOSE BLD GHB EST-MCNC: 123 MG/DL
GFR, ESTIMATED: 42 ML/MIN/1.73M2
GLUCOSE SERPL-MCNC: 126 MG/DL (ref 74–99)
GLUCOSE UR STRIP-MCNC: NEGATIVE MG/DL
HBA1C MFR BLD: 5.9 % (ref 4–6)
HCT VFR BLD AUTO: 42 % (ref 36.3–47.1)
HDLC SERPL-MCNC: 34 MG/DL
HGB BLD-MCNC: 13.2 G/DL (ref 11.9–15.1)
HGB UR QL STRIP.AUTO: NEGATIVE
IMM GRANULOCYTES # BLD AUTO: 0.03 K/UL (ref 0–0.3)
IMM GRANULOCYTES NFR BLD: 0 %
KETONES UR STRIP-MCNC: NEGATIVE MG/DL
LDLC SERPL CALC-MCNC: 74 MG/DL (ref 0–100)
LEUKOCYTE ESTERASE UR QL STRIP: ABNORMAL
LYMPHOCYTES NFR BLD: 2.41 K/UL (ref 1.1–3.7)
LYMPHOCYTES RELATIVE PERCENT: 33 % (ref 24–43)
MCH RBC QN AUTO: 27.7 PG (ref 25.2–33.5)
MCHC RBC AUTO-ENTMCNC: 31.4 G/DL (ref 28.4–34.8)
MCV RBC AUTO: 88.2 FL (ref 82.6–102.9)
MONOCYTES NFR BLD: 0.69 K/UL (ref 0.1–1.2)
MONOCYTES NFR BLD: 9 % (ref 3–12)
NEUTROPHILS NFR BLD: 54 % (ref 36–65)
NEUTS SEG NFR BLD: 3.98 K/UL (ref 1.5–8.1)
NITRITE UR QL STRIP: NEGATIVE
NRBC BLD-RTO: 0 PER 100 WBC
PH UR STRIP: 6.5 [PH] (ref 5–8)
PLATELET # BLD AUTO: 303 K/UL (ref 138–453)
PMV BLD AUTO: 10.4 FL (ref 8.1–13.5)
POTASSIUM SERPL-SCNC: 4.2 MMOL/L (ref 3.7–5.3)
PROT SERPL-MCNC: 7.3 G/DL (ref 6.6–8.7)
PROT UR STRIP-MCNC: NEGATIVE MG/DL
RBC # BLD AUTO: 4.76 M/UL (ref 3.95–5.11)
RBC #/AREA URNS HPF: ABNORMAL /HPF (ref 0–4)
SODIUM SERPL-SCNC: 139 MMOL/L (ref 136–145)
SP GR UR STRIP: 1.01 (ref 1–1.03)
TRIGL SERPL-MCNC: 203 MG/DL
TSH SERPL DL<=0.05 MIU/L-ACNC: 2.47 UIU/ML (ref 0.27–4.2)
UROBILINOGEN UR STRIP-ACNC: NORMAL EU/DL (ref 0–1)
VLDLC SERPL CALC-MCNC: 41 MG/DL (ref 1–30)
WBC #/AREA URNS HPF: ABNORMAL /HPF (ref 0–5)
WBC OTHER # BLD: 7.4 K/UL (ref 3.5–11.3)

## 2024-11-30 SDOH — ECONOMIC STABILITY: FOOD INSECURITY: WITHIN THE PAST 12 MONTHS, THE FOOD YOU BOUGHT JUST DIDN'T LAST AND YOU DIDN'T HAVE MONEY TO GET MORE.: NEVER TRUE

## 2024-11-30 SDOH — HEALTH STABILITY: PHYSICAL HEALTH: ON AVERAGE, HOW MANY MINUTES DO YOU ENGAGE IN EXERCISE AT THIS LEVEL?: 40 MIN

## 2024-11-30 SDOH — ECONOMIC STABILITY: FOOD INSECURITY: WITHIN THE PAST 12 MONTHS, YOU WORRIED THAT YOUR FOOD WOULD RUN OUT BEFORE YOU GOT MONEY TO BUY MORE.: NEVER TRUE

## 2024-11-30 SDOH — HEALTH STABILITY: PHYSICAL HEALTH: ON AVERAGE, HOW MANY DAYS PER WEEK DO YOU ENGAGE IN MODERATE TO STRENUOUS EXERCISE (LIKE A BRISK WALK)?: 7 DAYS

## 2024-11-30 SDOH — ECONOMIC STABILITY: INCOME INSECURITY: HOW HARD IS IT FOR YOU TO PAY FOR THE VERY BASICS LIKE FOOD, HOUSING, MEDICAL CARE, AND HEATING?: NOT HARD AT ALL

## 2024-11-30 ASSESSMENT — LIFESTYLE VARIABLES
HOW MANY STANDARD DRINKS CONTAINING ALCOHOL DO YOU HAVE ON A TYPICAL DAY: 0
HOW MANY STANDARD DRINKS CONTAINING ALCOHOL DO YOU HAVE ON A TYPICAL DAY: PATIENT DOES NOT DRINK
HOW OFTEN DO YOU HAVE A DRINK CONTAINING ALCOHOL: 1
HOW OFTEN DO YOU HAVE SIX OR MORE DRINKS ON ONE OCCASION: 1
HOW OFTEN DO YOU HAVE A DRINK CONTAINING ALCOHOL: NEVER

## 2024-11-30 ASSESSMENT — PATIENT HEALTH QUESTIONNAIRE - PHQ9
SUM OF ALL RESPONSES TO PHQ QUESTIONS 1-9: 0
1. LITTLE INTEREST OR PLEASURE IN DOING THINGS: NOT AT ALL
SUM OF ALL RESPONSES TO PHQ9 QUESTIONS 1 & 2: 0
SUM OF ALL RESPONSES TO PHQ QUESTIONS 1-9: 0
2. FEELING DOWN, DEPRESSED OR HOPELESS: NOT AT ALL

## 2024-12-03 ENCOUNTER — OFFICE VISIT (OUTPATIENT)
Age: 79
End: 2024-12-03

## 2024-12-03 VITALS
OXYGEN SATURATION: 98 % | BODY MASS INDEX: 24.59 KG/M2 | SYSTOLIC BLOOD PRESSURE: 120 MMHG | WEIGHT: 144 LBS | DIASTOLIC BLOOD PRESSURE: 78 MMHG | HEIGHT: 64 IN | HEART RATE: 71 BPM | TEMPERATURE: 97.4 F

## 2024-12-03 DIAGNOSIS — I10 PRIMARY HYPERTENSION: ICD-10-CM

## 2024-12-03 DIAGNOSIS — R53.83 FATIGUE, UNSPECIFIED TYPE: ICD-10-CM

## 2024-12-03 DIAGNOSIS — M47.22 CERVICAL RADICULOPATHY DUE TO DEGENERATIVE JOINT DISEASE OF SPINE: ICD-10-CM

## 2024-12-03 DIAGNOSIS — C64.1 CANCER OF RIGHT KIDNEY (HCC): ICD-10-CM

## 2024-12-03 DIAGNOSIS — E78.5 DYSLIPIDEMIA: ICD-10-CM

## 2024-12-03 DIAGNOSIS — Z00.00 MEDICARE ANNUAL WELLNESS VISIT, SUBSEQUENT: Primary | ICD-10-CM

## 2024-12-03 DIAGNOSIS — I10 ESSENTIAL HYPERTENSION: ICD-10-CM

## 2024-12-03 DIAGNOSIS — M51.360 DEGENERATION OF INTERVERTEBRAL DISC OF LUMBAR REGION WITH DISCOGENIC BACK PAIN: ICD-10-CM

## 2024-12-03 DIAGNOSIS — N18.31 STAGE 3A CHRONIC KIDNEY DISEASE (HCC): ICD-10-CM

## 2024-12-03 DIAGNOSIS — Z85.528 HISTORY OF RENAL CELL CANCER: ICD-10-CM

## 2024-12-03 DIAGNOSIS — Z12.31 BREAST CANCER SCREENING BY MAMMOGRAM: ICD-10-CM

## 2024-12-03 DIAGNOSIS — E55.9 VITAMIN D DEFICIENCY: ICD-10-CM

## 2024-12-03 DIAGNOSIS — E11.9 TYPE 2 DIABETES MELLITUS WITHOUT COMPLICATION, WITHOUT LONG-TERM CURRENT USE OF INSULIN (HCC): ICD-10-CM

## 2024-12-03 RX ORDER — PANTOPRAZOLE SODIUM 40 MG/1
40 TABLET, DELAYED RELEASE ORAL 2 TIMES DAILY
Qty: 180 TABLET | Refills: 1 | Status: SHIPPED | OUTPATIENT
Start: 2024-12-03

## 2024-12-03 RX ORDER — SIMVASTATIN 20 MG
10 TABLET ORAL DAILY
Qty: 135 TABLET | Refills: 3 | Status: SHIPPED | OUTPATIENT
Start: 2024-12-03 | End: 2024-12-03

## 2024-12-03 RX ORDER — SIMVASTATIN 20 MG
20 TABLET ORAL DAILY
Qty: 90 TABLET | Refills: 3 | Status: SHIPPED | OUTPATIENT
Start: 2024-12-03

## 2024-12-03 RX ORDER — AMLODIPINE BESYLATE 10 MG/1
10 TABLET ORAL DAILY
Qty: 180 TABLET | Refills: 1 | Status: SHIPPED | OUTPATIENT
Start: 2024-12-03

## 2024-12-03 RX ORDER — CARVEDILOL 12.5 MG/1
12.5 TABLET ORAL 2 TIMES DAILY WITH MEALS
Qty: 180 TABLET | Refills: 1 | Status: SHIPPED | OUTPATIENT
Start: 2024-12-03

## 2024-12-03 NOTE — PROGRESS NOTES
problems    Oxycodone-Acetaminophen Rash and Other (See Comments)     Gastrointestinal problems    Propoxyphene Rash and Other (See Comments)     Gastrointestinal problems    Sulfa Antibiotics Other (See Comments)     GI issues     Prior to Visit Medications    Medication Sig Taking? Authorizing Provider   pantoprazole (PROTONIX) 40 MG tablet Take 1 tablet by mouth 2 times daily Yes Andres Brandt MD   carvedilol (COREG) 12.5 MG tablet Take 1 tablet by mouth 2 times daily (with meals) Yes Andres Brandt MD   amLODIPine (NORVASC) 10 MG tablet Take 1 tablet by mouth daily Yes Andres Brandt MD   simvastatin (ZOCOR) 20 MG tablet Take 1 tablet by mouth daily Yes Andres Brandt MD   psyllium (METAMUCIL) 58.6 % packet Take 1 packet by mouth daily Yes Marta Carty MD   Flaxseed, Linseed, (FLAXSEED OIL) 1000 MG CAPS Take 1 capsule by mouth daily Yes Marta Carty MD   Coenzyme Q10 (COQ-10) 100 MG CAPS Take 1 capsule by mouth daily Yes Marat Carty MD   Cholecalciferol 50 MCG (2000 UT) TABS Take 1 tablet by mouth daily Yes Marta Carty MD   magnesium oxide (MAG-OX) 400 MG tablet Take 1 tablet by mouth every other day Yes Marta Carty MD   Lactobacillus Acid-Pectin (ACIDOPHILUS/CITRUS PECTIN) TABS Take 1 tablet by mouth daily (with breakfast) Yes Marta Carty MD   Multiple Vitamin (MVI, OPURITY BAND OPTIMIZED, CHEW TAB) Take 1 tablet by mouth daily Yes Marta Carty MD       CareTeam (Including outside providers/suppliers regularly involved in providing care):   Patient Care Team:  Andres Brandt MD as PCP - General (Family Medicine)  Andres Brandt MD as PCP - Empaneled Provider  Chelsea Hogue, RN as Nurse Navigator (Oncology)      Reviewed and updated this visit:  Tobacco  Allergies  Meds  Problems  Med Hx  Surg Hx  Soc Hx  Fam Hx              The patient (or guardian, if applicable) and other individuals in attendance with the patient were

## 2024-12-13 ENCOUNTER — TELEPHONE (OUTPATIENT)
Dept: ONCOLOGY | Age: 79
End: 2024-12-13

## 2024-12-13 NOTE — TELEPHONE ENCOUNTER
Name: Rut Burnham  : 1945  MRN: [id number]    Oncology Navigation Follow-Up Note    Contact Type:  Telephone    Notes: Writer called pt to f/u with her on plan for CT scan. No answer and phone just rang and rang. Writer then notes that pt scheduled CT for 24. Writer will track CT scan and f/u with Dr. Moeller and contact pt at that time.      Electronically signed by Chelsea Hogue RN on 2024 at 10:41 AM

## 2024-12-30 ENCOUNTER — HOSPITAL ENCOUNTER (OUTPATIENT)
Dept: CT IMAGING | Age: 79
Discharge: HOME | End: 2025-01-01
Attending: UROLOGY
Payer: MEDICARE

## 2024-12-30 DIAGNOSIS — N28.9 DISORDER OF KIDNEY AND URETER, UNSPECIFIED: ICD-10-CM

## 2024-12-30 PROCEDURE — 74176 CT ABD & PELVIS W/O CONTRAST: CPT

## 2025-01-21 ENCOUNTER — HOSPITAL ENCOUNTER (OUTPATIENT)
Dept: MAMMOGRAPHY | Age: 80
Discharge: HOME | End: 2025-01-23
Payer: MEDICARE

## 2025-01-21 VITALS — WEIGHT: 143 LBS | BODY MASS INDEX: 24.41 KG/M2 | HEIGHT: 64 IN

## 2025-01-21 DIAGNOSIS — Z12.31 BREAST CANCER SCREENING BY MAMMOGRAM: ICD-10-CM

## 2025-01-21 PROCEDURE — 77063 BREAST TOMOSYNTHESIS BI: CPT

## 2025-04-30 ENCOUNTER — HOSPITAL ENCOUNTER (OUTPATIENT)
Age: 80
Setting detail: SPECIMEN
Discharge: HOME | End: 2025-04-30

## 2025-04-30 LAB
ANION GAP SERPL CALCULATED.3IONS-SCNC: 13 MMOL/L (ref 9–16)
BUN SERPL-MCNC: 19 MG/DL (ref 8–23)
CALCIUM SERPL-MCNC: 9.9 MG/DL (ref 8.6–10.4)
CHLORIDE SERPL-SCNC: 103 MMOL/L (ref 98–107)
CO2 SERPL-SCNC: 26 MMOL/L (ref 20–31)
CREAT SERPL-MCNC: 1.2 MG/DL (ref 0.6–0.9)
ERYTHROCYTE [DISTWIDTH] IN BLOOD BY AUTOMATED COUNT: 14 % (ref 11.8–14.4)
GFR, ESTIMATED: 46 ML/MIN/1.73M2
GLUCOSE SERPL-MCNC: 131 MG/DL (ref 74–99)
HCT VFR BLD AUTO: 41.4 % (ref 36.3–47.1)
HGB BLD-MCNC: 13.3 G/DL (ref 11.9–15.1)
MAGNESIUM SERPL-MCNC: 2.3 MG/DL (ref 1.6–2.4)
MCH RBC QN AUTO: 27.8 PG (ref 25.2–33.5)
MCHC RBC AUTO-ENTMCNC: 32.1 G/DL (ref 28.4–34.8)
MCV RBC AUTO: 86.4 FL (ref 82.6–102.9)
NRBC BLD-RTO: 0 PER 100 WBC
PLATELET # BLD AUTO: 319 K/UL (ref 138–453)
PMV BLD AUTO: 10.6 FL (ref 8.1–13.5)
POTASSIUM SERPL-SCNC: 4.3 MMOL/L (ref 3.7–5.3)
RBC # BLD AUTO: 4.79 M/UL (ref 3.95–5.11)
SODIUM SERPL-SCNC: 142 MMOL/L (ref 136–145)
WBC OTHER # BLD: 7.5 K/UL (ref 3.5–11.3)

## 2025-05-27 ENCOUNTER — HOSPITAL ENCOUNTER (OUTPATIENT)
Dept: CT IMAGING | Age: 80
Discharge: HOME | End: 2025-05-29
Attending: INTERNAL MEDICINE
Payer: MEDICARE

## 2025-05-27 DIAGNOSIS — C64.2 CANCER OF LEFT KIDNEY (HCC): ICD-10-CM

## 2025-05-27 DIAGNOSIS — N18.30 STAGE 3 CHRONIC KIDNEY DISEASE, UNSPECIFIED WHETHER STAGE 3A OR 3B CKD (HCC): ICD-10-CM

## 2025-05-27 DIAGNOSIS — C64.2 RENAL CELL CANCER, LEFT (HCC): ICD-10-CM

## 2025-05-27 PROCEDURE — 74176 CT ABD & PELVIS W/O CONTRAST: CPT

## 2025-06-03 SDOH — HEALTH STABILITY: PHYSICAL HEALTH: ON AVERAGE, HOW MANY DAYS PER WEEK DO YOU ENGAGE IN MODERATE TO STRENUOUS EXERCISE (LIKE A BRISK WALK)?: 7 DAYS

## 2025-06-03 SDOH — ECONOMIC STABILITY: FOOD INSECURITY: WITHIN THE PAST 12 MONTHS, THE FOOD YOU BOUGHT JUST DIDN'T LAST AND YOU DIDN'T HAVE MONEY TO GET MORE.: NEVER TRUE

## 2025-06-03 SDOH — ECONOMIC STABILITY: TRANSPORTATION INSECURITY
IN THE PAST 12 MONTHS, HAS THE LACK OF TRANSPORTATION KEPT YOU FROM MEDICAL APPOINTMENTS OR FROM GETTING MEDICATIONS?: NO

## 2025-06-03 SDOH — ECONOMIC STABILITY: FOOD INSECURITY: WITHIN THE PAST 12 MONTHS, YOU WORRIED THAT YOUR FOOD WOULD RUN OUT BEFORE YOU GOT MONEY TO BUY MORE.: NEVER TRUE

## 2025-06-03 SDOH — ECONOMIC STABILITY: INCOME INSECURITY: IN THE LAST 12 MONTHS, WAS THERE A TIME WHEN YOU WERE NOT ABLE TO PAY THE MORTGAGE OR RENT ON TIME?: NO

## 2025-06-03 SDOH — HEALTH STABILITY: PHYSICAL HEALTH: ON AVERAGE, HOW MANY MINUTES DO YOU ENGAGE IN EXERCISE AT THIS LEVEL?: 40 MIN

## 2025-06-03 ASSESSMENT — PATIENT HEALTH QUESTIONNAIRE - PHQ9
2. FEELING DOWN, DEPRESSED OR HOPELESS: NOT AT ALL
SUM OF ALL RESPONSES TO PHQ QUESTIONS 1-9: 0
1. LITTLE INTEREST OR PLEASURE IN DOING THINGS: NOT AT ALL
SUM OF ALL RESPONSES TO PHQ QUESTIONS 1-9: 0

## 2025-06-03 ASSESSMENT — LIFESTYLE VARIABLES
HOW OFTEN DO YOU HAVE SIX OR MORE DRINKS ON ONE OCCASION: 1
HOW OFTEN DO YOU HAVE A DRINK CONTAINING ALCOHOL: NEVER
HOW MANY STANDARD DRINKS CONTAINING ALCOHOL DO YOU HAVE ON A TYPICAL DAY: PATIENT DOES NOT DRINK
HOW MANY STANDARD DRINKS CONTAINING ALCOHOL DO YOU HAVE ON A TYPICAL DAY: 0
HOW OFTEN DO YOU HAVE A DRINK CONTAINING ALCOHOL: 1

## 2025-06-05 ENCOUNTER — OFFICE VISIT (OUTPATIENT)
Age: 80
End: 2025-06-05

## 2025-06-05 VITALS
OXYGEN SATURATION: 97 % | BODY MASS INDEX: 24.48 KG/M2 | DIASTOLIC BLOOD PRESSURE: 60 MMHG | WEIGHT: 142.6 LBS | SYSTOLIC BLOOD PRESSURE: 108 MMHG | HEART RATE: 71 BPM

## 2025-06-05 DIAGNOSIS — R53.83 FATIGUE, UNSPECIFIED TYPE: ICD-10-CM

## 2025-06-05 DIAGNOSIS — Z85.528 HISTORY OF RENAL CELL CANCER: ICD-10-CM

## 2025-06-05 DIAGNOSIS — E78.5 DYSLIPIDEMIA: ICD-10-CM

## 2025-06-05 DIAGNOSIS — I10 ESSENTIAL HYPERTENSION: ICD-10-CM

## 2025-06-05 DIAGNOSIS — Z00.00 MEDICARE ANNUAL WELLNESS VISIT, SUBSEQUENT: Primary | ICD-10-CM

## 2025-06-05 DIAGNOSIS — G89.29 CHRONIC PAIN OF LEFT KNEE: ICD-10-CM

## 2025-06-05 DIAGNOSIS — K83.8 PNEUMOBILIA: ICD-10-CM

## 2025-06-05 DIAGNOSIS — K83.8 COMMON BILE DUCT DILATATION: ICD-10-CM

## 2025-06-05 DIAGNOSIS — E11.9 TYPE 2 DIABETES MELLITUS WITHOUT COMPLICATION, WITHOUT LONG-TERM CURRENT USE OF INSULIN (HCC): ICD-10-CM

## 2025-06-05 DIAGNOSIS — N18.31 STAGE 3A CHRONIC KIDNEY DISEASE (HCC): ICD-10-CM

## 2025-06-05 DIAGNOSIS — M25.562 CHRONIC PAIN OF LEFT KNEE: ICD-10-CM

## 2025-06-05 DIAGNOSIS — C64.2 RENAL CELL CANCER, LEFT (HCC): ICD-10-CM

## 2025-06-05 DIAGNOSIS — I10 PRIMARY HYPERTENSION: ICD-10-CM

## 2025-06-05 RX ORDER — MULTIVITAMIN WITH IRON
250 TABLET ORAL DAILY
COMMUNITY

## 2025-06-05 NOTE — PROGRESS NOTES
MD DAVIS   simvastatin (ZOCOR) 20 MG tablet Take 1 tablet by mouth daily Yes Andres Brandt MD   Flaxseed, Linseed, (FLAXSEED OIL) 1000 MG CAPS Take 1 capsule by mouth daily Yes Marta Carty MD   Coenzyme Q10 (COQ-10) 100 MG CAPS Take 1 capsule by mouth daily Yes Marta Carty MD   Cholecalciferol 50 MCG (2000 UT) TABS Take 1 tablet by mouth daily Yes Marta Carty MD   Lactobacillus Acid-Pectin (ACIDOPHILUS/CITRUS PECTIN) TABS Take 1 tablet by mouth daily (with breakfast) Yes Marta Carty MD   Multiple Vitamin (MVI, OPURITY BAND OPTIMIZED, CHEW TAB) Take 1 tablet by mouth daily Yes Marta Carty MD       CareTeam (Including outside providers/suppliers regularly involved in providing care):   Patient Care Team:  Andres Brandt MD as PCP - General (Family Medicine)  Andres Brandt MD as PCP - Empaneled Provider     Recommendations for Preventive Services Due: see orders and patient instructions/AVS.  Recommended screening schedule for the next 5-10 years is provided to the patient in written form: see Patient Instructions/AVS.     Reviewed and updated this visit:  Tobacco  Allergies  Meds  Problems  Med Hx  Surg Hx  Fam Hx       Sexual History: Patient declined to answer.           The patient (or guardian, if applicable) and other individuals in attendance with the patient were advised that Artificial Intelligence will be utilized during this visit to record and process the conversation to generate a clinical note. The patient (or guardian, if applicable) and other individuals in attendance at the appointment consented to the use of AI, including the recording.       Composite Graft Text: The defect edges were debeveled with a #15 scalpel blade.  Given the location of the defect, shape of the defect, the proximity to free margins and the fact the defect was full thickness a composite graft was deemed most appropriate.  The defect was outline and then transferred to the donor site.  A full thickness graft was then excised from the donor site. The graft was then placed in the primary defect, oriented appropriately and then sutured into place.  The secondary defect was then repaired using a primary closure.

## 2025-06-10 ENCOUNTER — HOSPITAL ENCOUNTER (OUTPATIENT)
Age: 80
Setting detail: THERAPIES SERIES
Discharge: HOME | End: 2025-06-10
Payer: MEDICARE

## 2025-06-10 PROCEDURE — 97110 THERAPEUTIC EXERCISES: CPT

## 2025-06-10 PROCEDURE — 97161 PT EVAL LOW COMPLEX 20 MIN: CPT

## 2025-06-10 NOTE — CONSULTS
[] [facility]  Outpatient Rehabilitation &  Therapy  [address]  [phone]  [phone] [] [facility]  Outpatient Rehabilitation &  Therapy  [address]  P: [phone]  F: [phone] [] [facility]  Outpatient Rehabilitation &  Therapy  [address]  P: [phone]  F: [phone] [] [facility]  Outpatient Rehabilitation &  Therapy  [address]  [phone]  [phone] [] [facility]  Outpatient Rehabilitation &  Therapy  [address]   P: [phone]  F: [phone]  [] [facility]  Outpatient Rehabilitation &  Therapy  [address]  P: [phone]  F: [phone] [x] [facility]  Outpatient Rehabilitation &  Therapy  [address]  P: [phone]  F: [phone] [] [facility]  Outpatient Rehabilitation &  Therapy  [address]  P: [phone]  F: [phone] [] [facility]  Outpatient Rehabilitation &  Therapy  [address]  P: [phone]  F: [phone]  [] [facility] Outpatient Rehabilitation &  Therapy  [address]  P: [phone]  F: [phone]     Physical Therapy General Evaluation    Date:  6/10/2025  Patient: Rut Burnham  : 1945  MRN: [id number]  Physician: Andres Brandt MD      Insurance: [employer] MEDICARE;  No Hard Max;  No AUTH;  TRACK CAP  Medical Diagnosis: M25.562, G89.29 (ICD-10-CM) - Chronic pain of left knee     Rehab Codes: M25.652, M25.662, R26.2; M25.672  Onset Date: 25                                  Next 's appt: 25    Subjective:  Pt reports about 3 months ago pain started in left knee (ant and med).  She reports her stamina has been down since last summer and she had been trying to walk farther.  She reports some pain at rest but most pain

## 2025-06-11 ENCOUNTER — TELEPHONE (OUTPATIENT)
Age: 80
End: 2025-06-11

## 2025-06-11 ENCOUNTER — OFFICE VISIT (OUTPATIENT)
Age: 80
End: 2025-06-11
Payer: MEDICARE

## 2025-06-11 VITALS
HEART RATE: 60 BPM | TEMPERATURE: 97.2 F | WEIGHT: 145 LBS | RESPIRATION RATE: 18 BRPM | DIASTOLIC BLOOD PRESSURE: 66 MMHG | BODY MASS INDEX: 24.89 KG/M2 | SYSTOLIC BLOOD PRESSURE: 126 MMHG | OXYGEN SATURATION: 96 %

## 2025-06-11 DIAGNOSIS — N18.30 STAGE 3 CHRONIC KIDNEY DISEASE, UNSPECIFIED WHETHER STAGE 3A OR 3B CKD (HCC): ICD-10-CM

## 2025-06-11 DIAGNOSIS — C64.2 RENAL CANCER, LEFT (HCC): ICD-10-CM

## 2025-06-11 DIAGNOSIS — C64.1 CANCER OF RIGHT KIDNEY (HCC): Primary | ICD-10-CM

## 2025-06-11 PROCEDURE — 3078F DIAST BP <80 MM HG: CPT | Performed by: INTERNAL MEDICINE

## 2025-06-11 PROCEDURE — 3074F SYST BP LT 130 MM HG: CPT | Performed by: INTERNAL MEDICINE

## 2025-06-11 PROCEDURE — 99214 OFFICE O/P EST MOD 30 MIN: CPT | Performed by: INTERNAL MEDICINE

## 2025-06-11 PROCEDURE — 1123F ACP DISCUSS/DSCN MKR DOCD: CPT | Performed by: INTERNAL MEDICINE

## 2025-06-11 PROCEDURE — 1126F AMNT PAIN NOTED NONE PRSNT: CPT | Performed by: INTERNAL MEDICINE

## 2025-06-11 PROCEDURE — 1159F MED LIST DOCD IN RCRD: CPT | Performed by: INTERNAL MEDICINE

## 2025-06-11 NOTE — PROGRESS NOTES
clinical data.  Reviewed records from outside facility reviewed previous medical record  Reviewed results of CT scan.  Reviewed images.  Compared with his prior scan.  Overall stable finding  Reviewed NCCN guidelines for surveillance  Will repeat scans in 1 year or sooner if clinically indicated  Patient is status post cryoablation of the right kidney back in May 2024  Patient had multiple questions which answered to the best my ability.    DANNA ACOSTA MD      This note is created with the assistance of a speech recognition program.  While intending to generate a document that actually reflects the content of the visit, the document can still have some errors including those of syntax and sound a like substitutions which may escape proof reading.  It such instances, actual meaning can be extrapolated by contextual diversion.

## 2025-06-11 NOTE — TELEPHONE ENCOUNTER
Instructions   from Dr. Tyson Moeller MD    Rv in 1 yr   Scan and labs 1 week before rv     CT Abdomen Pelvis Without Contrast    Labs and imaging ordered today  CBC with Auto Differential  Complete this on or around 6/11/2025.    RV scheduled 06/24/2026

## 2025-06-12 ENCOUNTER — HOSPITAL ENCOUNTER (OUTPATIENT)
Age: 80
Setting detail: THERAPIES SERIES
Discharge: HOME | End: 2025-06-12
Payer: MEDICARE

## 2025-06-12 PROCEDURE — 97110 THERAPEUTIC EXERCISES: CPT

## 2025-06-12 NOTE — FLOWSHEET NOTE
weekly - 1 sets - 10 reps  - Sidelying Hip Abduction  - 2 x daily - 7 x weekly - 1 sets - 10 reps  - Prone Hip Extension  - 2 x daily - 7 x weekly - 1 sets - 10 reps  - Sidelying Hip Adduction  - 2 x daily - 7 x weekly - 1 sets - 10 reps  - Supine Hip Adduction Isometric with Ball  - 2 x daily - 7 x weekly - 1 sets - 10 reps - 2 seconds hold  - Long Sitting Calf Stretch with Strap  - 2 x daily - 7 x weekly - 1 sets - 4 reps - 15 seconds hold  - Straight Leg Raise with External Rotation  - 2 x daily - 7 x weekly - 1 sets - 10 reps    Access Code: [email or web address]  URL: [email or web address]  Date: 06/12/2025  Prepared by: Gray Howell    Exercises    - Modified Faustino Stretch  - 2 x daily - 7 x weekly - 1 sets - 2 reps - 30 seconds hold  - Step Up  - 2 x daily - 7 x weekly - 1 sets - 15 reps  - Standing Lateral Step-Down Heel Tap  - 2 x daily - 7 x weekly - 1 sets - 10-15 reps        Plan: [x] Continue current frequency toward long and short term goals.          Time In:8:04  AM            Time Out:   8:52  AM    Electronically signed by:  Gray Howell, PT

## 2025-06-13 PROBLEM — M25.562 CHRONIC PAIN OF LEFT KNEE: Status: ACTIVE | Noted: 2025-06-13

## 2025-06-13 PROBLEM — Z00.00 MEDICARE ANNUAL WELLNESS VISIT, SUBSEQUENT: Status: ACTIVE | Noted: 2025-06-13

## 2025-06-13 PROBLEM — G89.29 CHRONIC PAIN OF LEFT KNEE: Status: ACTIVE | Noted: 2025-06-13

## 2025-06-13 PROBLEM — K83.8 COMMON BILE DUCT DILATATION: Status: ACTIVE | Noted: 2025-06-13

## 2025-06-16 ENCOUNTER — HOSPITAL ENCOUNTER (OUTPATIENT)
Age: 80
Setting detail: THERAPIES SERIES
Discharge: HOME | End: 2025-06-16
Payer: MEDICARE

## 2025-06-16 PROCEDURE — 97110 THERAPEUTIC EXERCISES: CPT

## 2025-06-16 NOTE — FLOWSHEET NOTE
[] [facility]  Outpatient Rehabilitation &  Therapy  [address]  [phone]  [phone] [] [facility]  Outpatient Rehabilitation &  Therapy  [address]  P: [phone]  F: [phone] [] [facility]  Outpatient Rehabilitation &  Therapy  [address]  P: [phone]  F: [phone] [] [facility]  Outpatient Rehabilitation &  Therapy  [address]  [phone]  [phone] [] [facility]  Outpatient Rehabilitation &  Therapy  [address]   P: [phone]  F: [phone]  [] [facility]  Outpatient Rehabilitation &  Therapy  [address]  P: [phone]  F: [phone] [x] [facility]  Outpatient Rehabilitation &  Therapy  [address]  P: [phone]  F: [phone] [] [facility]  Outpatient Rehabilitation &  Therapy  [address]  P: [phone]  F: [phone] [] [facility]  Outpatient Rehabilitation &  Therapy  [address]  P: [phone]  F: [phone]  [] [facility] Outpatient Rehabilitation &  Therapy  [address]  P: [phone]  F: [phone]     Physical Therapy Daily Treatment Note    Date:  2025  Patient Name:  Rut Burnham    :  1945  MRN: [id number]  Physician: Andres Brandt MD                                 Insurance: AETNA MEDICARE;  No Hard Max;  No AUTH;  TRACK CAP  Medical Diagnosis: M25.562, G89.29 (ICD-10-CM) - Chronic pain of left knee                   Rehab Codes: M25.652, M25.662, R26.2; M25.672  Onset Date: 25                                  Next 's appt: 25     Visit# / total visits: 3/24; Progress note for Medicare patient due at visit 10     Cancels/No Shows: 0/0    Subjective:  Pt reports about 3 months ago pain started in left knee

## 2025-06-17 ENCOUNTER — HOSPITAL ENCOUNTER (OUTPATIENT)
Dept: MRI IMAGING | Age: 80
Discharge: HOME | End: 2025-06-19
Attending: FAMILY MEDICINE
Payer: MEDICARE

## 2025-06-17 ENCOUNTER — HOSPITAL ENCOUNTER (OUTPATIENT)
Dept: GENERAL RADIOLOGY | Age: 80
Discharge: HOME | End: 2025-06-19
Payer: MEDICARE

## 2025-06-17 DIAGNOSIS — K83.8 COMMON BILE DUCT DILATATION: ICD-10-CM

## 2025-06-17 DIAGNOSIS — M25.562 CHRONIC PAIN OF LEFT KNEE: ICD-10-CM

## 2025-06-17 DIAGNOSIS — G89.29 CHRONIC PAIN OF LEFT KNEE: ICD-10-CM

## 2025-06-17 DIAGNOSIS — K83.8 PNEUMOBILIA: ICD-10-CM

## 2025-06-17 PROCEDURE — 74181 MRI ABDOMEN W/O CONTRAST: CPT

## 2025-06-17 PROCEDURE — 73562 X-RAY EXAM OF KNEE 3: CPT

## 2025-06-18 ENCOUNTER — HOSPITAL ENCOUNTER (OUTPATIENT)
Age: 80
Setting detail: THERAPIES SERIES
Discharge: HOME | End: 2025-06-18
Payer: MEDICARE

## 2025-06-18 PROCEDURE — 97110 THERAPEUTIC EXERCISES: CPT

## 2025-06-23 ENCOUNTER — HOSPITAL ENCOUNTER (OUTPATIENT)
Age: 80
Setting detail: THERAPIES SERIES
Discharge: HOME | End: 2025-06-23
Payer: MEDICARE

## 2025-06-23 PROCEDURE — 97110 THERAPEUTIC EXERCISES: CPT

## 2025-06-23 NOTE — FLOWSHEET NOTE
[x] Yes  [] No  [x] Reviewed Prior HEP/Ed  Method of Education: [x] Verbal  [x] Demo  [x] Written  Comprehension of Education:  [x] Verbalizes understanding.  [] Demonstrates understanding.  [x] Needs review.  [] Demonstrates/verbalizes HEP/Ed previously given.  Access Code: [email or web address]  URL: [email or web address]  Date: 06/23/2025  Prepared by: Chelsea Lynn    Exercises  - Seated Ankle Plantarflexion with Resistance  - 1 x daily - 7 x weekly - 1 sets - 10 reps  - Ankle Dorsiflexion with Resistance  - 1 x daily - 7 x weekly - 1 sets - 10 reps  - Ankle Inversion with Resistance  - 1 x daily - 7 x weekly - 1 sets - 10 reps  - Ankle Eversion with Resistance  - 1 x daily - 7 x weekly - 1 sets - 10 reps  ccess Code: [email or web address]  URL: [email or web address]  Date: 06/10/2025  Prepared by: Gray Howell     Exercises  - Supine Straight Leg Raises  - 2 x daily - 7 x weekly - 1 sets - 10 reps  - Sidelying Hip Abduction  - 2 x daily - 7 x weekly - 1 sets - 10 reps  - Prone Hip Extension  - 2 x daily - 7 x weekly - 1 sets - 10 reps  - Sidelying Hip Adduction  - 2 x daily - 7 x weekly - 1 sets - 10 reps  - Supine Hip Adduction Isometric with Ball  - 2 x daily - 7 x weekly - 1 sets - 10 reps - 2 seconds hold  - Long Sitting Calf Stretch with Strap  - 2 x daily - 7 x weekly - 1 sets - 4 reps - 15 seconds hold  - Straight Leg Raise with External Rotation  - 2 x daily - 7 x weekly - 1 sets - 10 reps    Access Code: [email or web address]  URL: [email or web address]  Date: 06/12/2025  Prepared by: Gray Howell    Exercises    - Modified Faustino Stretch  - 2 x daily - 7 x weekly - 1 sets - 2 reps - 30 seconds hold  - Step Up  - 2 x daily - 7 x weekly - 1 sets - 15 reps  - Standing Lateral Step-Down Heel Tap  - 2 x daily - 7 x weekly - 1 sets - 10-15 reps        Plan: [x] Continue current frequency toward long and short term goals.          Time In:   0801 am         Time Out: 0842 am      Electronically signed by:  Chelsea Lynn PTA

## 2025-06-25 ENCOUNTER — RESULTS FOLLOW-UP (OUTPATIENT)
Age: 80
End: 2025-06-25

## 2025-06-25 ENCOUNTER — HOSPITAL ENCOUNTER (OUTPATIENT)
Age: 80
Setting detail: THERAPIES SERIES
Discharge: HOME | End: 2025-06-25
Payer: MEDICARE

## 2025-06-25 PROCEDURE — 97110 THERAPEUTIC EXERCISES: CPT

## 2025-06-25 NOTE — FLOWSHEET NOTE
[] [facility]  Outpatient Rehabilitation &  Therapy  [address]  [phone]  [phone] [] [facility]  Outpatient Rehabilitation &  Therapy  [address]  P: [phone]  F: [phone] [] [facility]  Outpatient Rehabilitation &  Therapy  [address]  P: [phone]  F: [phone] [] [facility]  Outpatient Rehabilitation &  Therapy  [address]  [phone]  [phone] [] [facility]  Outpatient Rehabilitation &  Therapy  [address]   P: [phone]  F: [phone]  [] [facility]  Outpatient Rehabilitation &  Therapy  [address]  P: [phone]  F: [phone] [x] [facility]  Outpatient Rehabilitation &  Therapy  [address]  P: [phone]  F: [phone] [] [facility]  Outpatient Rehabilitation &  Therapy  [address]  P: [phone]  F: [phone] [] [facility]  Outpatient Rehabilitation &  Therapy  [address]  P: [phone]  F: [phone]  [] [facility] Outpatient Rehabilitation &  Therapy  [address]  P: [phone]  F: [phone]     Physical Therapy Daily Treatment Note    Date:  2025  Patient Name:  Rut Burnham    :  1945  MRN: [id number]  Physician: Andres Brandt MD                                 Insurance: AETNA MEDICARE;  No Hard Max;  No AUTH;  TRACK CAP  Medical Diagnosis: M25.562, G89.29 (ICD-10-CM) - Chronic pain of left knee                   Rehab Codes: M25.652, M25.662, R26.2; M25.672  Onset Date: 25                                  Next 's appt: 25     Visit# / total visits: ; Progress note for Medicare patient due at visit 10     Cancels/No Shows: 0/0    Subjective:  Pt reports about 3 months ago pain started in left knee

## 2025-07-01 ENCOUNTER — HOSPITAL ENCOUNTER (OUTPATIENT)
Age: 80
Setting detail: THERAPIES SERIES
Discharge: HOME | End: 2025-07-01
Payer: MEDICARE

## 2025-07-01 PROCEDURE — 97110 THERAPEUTIC EXERCISES: CPT

## 2025-07-01 NOTE — FLOWSHEET NOTE
[] [facility]  Outpatient Rehabilitation &  Therapy  [address]  [phone]  [phone] [] [facility]  Outpatient Rehabilitation &  Therapy  [address]  P: [phone]  F: [phone] [] [facility]  Outpatient Rehabilitation &  Therapy  [address]  P: [phone]  F: [phone] [] [facility]  Outpatient Rehabilitation &  Therapy  [address]  [phone]  [phone] [] [facility]  Outpatient Rehabilitation &  Therapy  [address]   P: [phone]  F: [phone]  [] [facility]  Outpatient Rehabilitation &  Therapy  [address]  P: [phone]  F: [phone] [x] [facility]  Outpatient Rehabilitation &  Therapy  [address]  P: [phone]  F: [phone] [] [facility]  Outpatient Rehabilitation &  Therapy  [address]  P: [phone]  F: [phone] [] [facility]  Outpatient Rehabilitation &  Therapy  [address]  P: [phone]  F: [phone]  [] [facility] Outpatient Rehabilitation &  Therapy  [address]  P: [phone]  F: [phone]     Physical Therapy Daily Treatment Note    Date:  2025  Patient Name:  Rut Burnham    :  1945  MRN: [id number]  Physician: Andres Brandt MD                                 Insurance: AETNA MEDICARE;  No Hard Max;  No AUTH;  TRACK CAP  Medical Diagnosis: M25.562, G89.29 (ICD-10-CM) - Chronic pain of left knee                   Rehab Codes: M25.652, M25.662, R26.2; M25.672  Onset Date: 25                                  Next 's appt: 25     Visit# / total visits: ; Progress note for Medicare patient due at visit 10     Cancels/No Shows: 0/0    Subjective:  Pt reports about 3 months ago pain started in left knee

## 2025-07-03 ENCOUNTER — HOSPITAL ENCOUNTER (OUTPATIENT)
Age: 80
Setting detail: THERAPIES SERIES
Discharge: HOME | End: 2025-07-03
Payer: MEDICARE

## 2025-07-03 PROCEDURE — 97110 THERAPEUTIC EXERCISES: CPT

## 2025-07-03 NOTE — FLOWSHEET NOTE
[] [facility]  Outpatient Rehabilitation &  Therapy  [address]  [phone]  [phone] [] [facility]  Outpatient Rehabilitation &  Therapy  [address]  P: [phone]  F: [phone] [] [facility]  Outpatient Rehabilitation &  Therapy  [address]  P: [phone]  F: [phone] [] [facility]  Outpatient Rehabilitation &  Therapy  [address]  [phone]  [phone] [] [facility]  Outpatient Rehabilitation &  Therapy  [address]   P: [phone]  F: [phone]  [] [facility]  Outpatient Rehabilitation &  Therapy  [address]  P: [phone]  F: [phone] [x] [facility]  Outpatient Rehabilitation &  Therapy  [address]  P: [phone]  F: [phone] [] [facility]  Outpatient Rehabilitation &  Therapy  [address]  P: [phone]  F: [phone] [] [facility]  Outpatient Rehabilitation &  Therapy  [address]  P: [phone]  F: [phone]  [] [facility] Outpatient Rehabilitation &  Therapy  [address]  P: [phone]  F: [phone]     Physical Therapy Daily Treatment Note    Date:  7/3/2025  Patient Name:  Rut Burnham    :  1945  MRN: [id number]  Physician: Andres Brandt MD                                 Insurance: AETNA MEDICARE;  No Hard Max;  No AUTH;  TRACK CAP  Medical Diagnosis: M25.562, G89.29 (ICD-10-CM) - Chronic pain of left knee                   Rehab Codes: M25.652, M25.662, R26.2; M25.672  Onset Date: 25                                  Next 's appt: 25     Visit# / total visits: ; Progress note for Medicare patient due at visit 10     Cancels/No Shows: 0/0    Subjective:  Pt reports about 3 months ago pain started in left knee

## 2025-07-08 ENCOUNTER — HOSPITAL ENCOUNTER (OUTPATIENT)
Age: 80
Setting detail: THERAPIES SERIES
Discharge: HOME | End: 2025-07-08
Payer: MEDICARE

## 2025-07-08 PROCEDURE — 97110 THERAPEUTIC EXERCISES: CPT

## 2025-07-08 NOTE — PROGRESS NOTES
[] [facility]  Outpatient Rehabilitation &  Therapy  [address]  [phone]  [phone] [] [facility]  Outpatient Rehabilitation &  Therapy  [address]  P: [phone]  F: [phone] [] [facility]  Outpatient Rehabilitation &  Therapy  [address]  P: [phone]  F: [phone] [] [facility]  Outpatient Rehabilitation &  Therapy  [address]  [phone]  [phone] [] [facility]  Outpatient Rehabilitation &  Therapy  [address]   P: [phone]  F: [phone]  [] [facility]  Outpatient Rehabilitation &  Therapy  [address]  P: [phone]  F: [phone] [x] [facility]  Outpatient Rehabilitation &  Therapy  [address]  P: [phone]  F: [phone] [] [facility]  Outpatient Rehabilitation &  Therapy  [address]  P: [phone]  F: [phone] [] [facility]  Outpatient Rehabilitation &  Therapy  [address]  P: [phone]  F: [phone]  [] [facility] Outpatient Rehabilitation &  Therapy  [address]  P: [phone]  F: [phone]     Physical Therapy Daily Treatment Note/PROGRESS NOTE    Date:  2025  Patient Name:  Rut Burnham    :  1945  MRN: [id number]  Physician: Andres Brandt MD                                 Insurance: AETNA MEDICARE;  No Hard Max;  No AUTH;  TRACK CAP  Medical Diagnosis: M25.562, G89.29 (ICD-10-CM) - Chronic pain of left knee                   Rehab Codes: M25.652, M25.662, R26.2; M25.672  Onset Date: 25                                  Next 's appt: 25     Visit# / total visits: ; Progress note for Medicare patient due at visit 19  Progress Note completed on 25 covers from 6/10/25--25.  Cancels/No Shows:

## 2025-07-10 ENCOUNTER — HOSPITAL ENCOUNTER (OUTPATIENT)
Age: 80
Setting detail: THERAPIES SERIES
Discharge: HOME | End: 2025-07-10
Payer: MEDICARE

## 2025-07-10 PROCEDURE — 97110 THERAPEUTIC EXERCISES: CPT

## 2025-07-10 NOTE — FLOWSHEET NOTE
left hip to at least 4-/5 so pt can walk with less knee pain  GOAL MET  ? Function:  Pt to stand and clean home 20 minutes per day without increased left knee pain  PROGRESSING  Patient to be independent with home exercise program as demonstrated by performance with correct form without cues. PROGRESSING     LTG: (to be met in 24 treatments)  Improve WOMAC score to 7% impaired at worst so pt can walk a mile 3 days per week  7/8/25  SCORED 17/88 OR 19% IMPAIRED  Improve Left LE strength to wfl so pt can ascend/descend 12 steps without left knee pain increasing  Pt to stand and clean 30 minutes without increased left knee pain     Patient goals: Improve stamina and ability to walk       Pt. Education:  [x] Yes  [] No  [x] Reviewed Prior HEP/Ed  Method of Education: [x] Verbal  [x] Demo  [x] Written  Comprehension of Education:  [x] Verbalizes understanding.  [] Demonstrates understanding.  [x] Needs review.  [] Demonstrates/verbalizes HEP/Ed previously given.  Access Code: [email or web address]  URL: [email or web address]  Date: 07/10/2025  Prepared by: Chelsea Lynn    Exercises    - Supine Bridge  - 1 x daily - 7 x weekly - 1 sets - 10 reps - 5\" hold    Access Code: [email or web address]  URL: [email or web address]  Date: 06/23/2025  Prepared by: Chelsea Lynn    Exercises  - Seated Ankle Plantarflexion with Resistance  - 1 x daily - 7 x weekly - 1 sets - 10 reps  - Ankle Dorsiflexion with Resistance  - 1 x daily - 7 x weekly - 1 sets - 10 reps  - Ankle Inversion with Resistance  - 1 x daily - 7 x weekly - 1 sets - 10 reps  - Ankle Eversion with Resistance  - 1 x daily - 7 x weekly - 1 sets - 10 reps  ccess Code: [email or web address]  URL: [email or web address]  Date: 06/10/2025  Prepared by: Gray Howell     Exercises  - Supine Straight Leg Raises  - 2 x daily - 7 x weekly - 1 sets - 10 reps  - Sidelying Hip Abduction  - 2 x daily - 7 x weekly - 1 sets - 10 reps  - Prone Hip Extension  - 2 x daily - 7 x weekly - 1 sets - 10 reps  -

## 2025-07-13 DIAGNOSIS — I10 ESSENTIAL HYPERTENSION: ICD-10-CM

## 2025-07-13 PROBLEM — Z00.00 MEDICARE ANNUAL WELLNESS VISIT, SUBSEQUENT: Status: RESOLVED | Noted: 2025-06-13 | Resolved: 2025-07-13

## 2025-07-14 ENCOUNTER — HOSPITAL ENCOUNTER (OUTPATIENT)
Age: 80
Setting detail: THERAPIES SERIES
Discharge: HOME | End: 2025-07-14
Payer: MEDICARE

## 2025-07-14 PROCEDURE — 97110 THERAPEUTIC EXERCISES: CPT

## 2025-07-14 RX ORDER — CARVEDILOL 12.5 MG/1
TABLET ORAL
Qty: 180 TABLET | Refills: 1 | Status: SHIPPED | OUTPATIENT
Start: 2025-07-14

## 2025-07-14 RX ORDER — PANTOPRAZOLE SODIUM 40 MG/1
40 TABLET, DELAYED RELEASE ORAL 2 TIMES DAILY
Qty: 180 TABLET | Refills: 1 | Status: SHIPPED | OUTPATIENT
Start: 2025-07-14

## 2025-07-14 NOTE — FLOWSHEET NOTE
least 4-/5 so pt can walk with less knee pain  GOAL MET  ? Function:  Pt to stand and clean home 20 minutes per day without increased left knee pain  PROGRESSING  Patient to be independent with home exercise program as demonstrated by performance with correct form without cues. PROGRESSING     LTG: (to be met in 24 treatments)  Improve WOMAC score to 7% impaired at worst so pt can walk a mile 3 days per week  7/8/25  SCORED 17/88 OR 19% IMPAIRED  Improve Left LE strength to wfl so pt can ascend/descend 12 steps without left knee pain increasing  Pt to stand and clean 30 minutes without increased left knee pain     Patient goals: Improve stamina and ability to walk       Pt. Education:  [x] Yes  [] No  [x] Reviewed Prior HEP/Ed  Method of Education: [x] Verbal  [x] Demo  [x] Written  Comprehension of Education:  [x] Verbalizes understanding.  [] Demonstrates understanding.  [x] Needs review.  [] Demonstrates/verbalizes HEP/Ed previously given.  Access Code: [email or web address]  URL: [email or web address]  Date: 07/10/2025  Prepared by: Chelsea Lynn    Exercises    - Supine Bridge  - 1 x daily - 7 x weekly - 1 sets - 10 reps - 5\" hold    Access Code: [email or web address]  URL: [email or web address]  Date: 06/23/2025  Prepared by: Chelsea Lynn    Exercises  - Seated Ankle Plantarflexion with Resistance  - 1 x daily - 7 x weekly - 1 sets - 10 reps  - Ankle Dorsiflexion with Resistance  - 1 x daily - 7 x weekly - 1 sets - 10 reps  - Ankle Inversion with Resistance  - 1 x daily - 7 x weekly - 1 sets - 10 reps  - Ankle Eversion with Resistance  - 1 x daily - 7 x weekly - 1 sets - 10 reps  ccess Code: [email or web address]  URL: [email or web address]  Date: 06/10/2025  Prepared by: Gray Howell     Exercises  - Supine Straight Leg Raises  - 2 x daily - 7 x weekly - 1 sets - 10 reps  - Sidelying Hip Abduction  - 2 x daily - 7 x weekly - 1 sets - 10 reps  - Prone Hip Extension  - 2 x daily - 7 x weekly - 1 sets - 10 reps  - Sidelying Hip

## 2025-07-14 NOTE — TELEPHONE ENCOUNTER
Rut Burnham is calling to request a refill on the following medication(s):    Medication Request:  Requested Prescriptions     Pending Prescriptions Disp Refills    carvedilol (COREG) 12.5 MG tablet [Pharmacy Med Name: CARVEDILOL 12.5 MG TABLET] 180 tablet 1     Sig: TAKE 1 TABLET BY MOUTH TWICE A DAY WITH A MEAL    pantoprazole (PROTONIX) 40 MG tablet [Pharmacy Med Name: PANTOPRAZOLE SOD DR 40 MG TAB] 180 tablet 1     Sig: TAKE 1 TABLET BY MOUTH 2 TIMES A DAY       Last Visit Date (If Applicable):  6/5/2025    Next Visit Date:    7/21/2025

## 2025-07-16 ENCOUNTER — HOSPITAL ENCOUNTER (OUTPATIENT)
Age: 80
Setting detail: THERAPIES SERIES
Discharge: HOME | End: 2025-07-16
Payer: MEDICARE

## 2025-07-16 PROCEDURE — 97110 THERAPEUTIC EXERCISES: CPT

## 2025-07-16 NOTE — FLOWSHEET NOTE
[] [facility]  Outpatient Rehabilitation &  Therapy  [address]  [phone]  [phone] [] [facility]  Outpatient Rehabilitation &  Therapy  [address]  P: [phone]  F: [phone] [] [facility]  Outpatient Rehabilitation &  Therapy  [address]  P: [phone]  F: [phone] [] [facility]  Outpatient Rehabilitation &  Therapy  [address]  [phone]  [phone] [] [facility]  Outpatient Rehabilitation &  Therapy  [address]   P: [phone]  F: [phone]  [] [facility]  Outpatient Rehabilitation &  Therapy  [address]  P: [phone]  F: [phone] [x] [facility]  Outpatient Rehabilitation &  Therapy  [address]  P: [phone]  F: [phone] [] [facility]  Outpatient Rehabilitation &  Therapy  [address]  P: [phone]  F: [phone] [] [facility]  Outpatient Rehabilitation &  Therapy  [address]  P: [phone]  F: [phone]  [] [facility] Outpatient Rehabilitation &  Therapy  [address]  P: [phone]  F: [phone]     Physical Therapy Daily Treatment Note    Date:  2025  Patient Name:  Rut Burnham    :  1945  MRN: [id number]  Physician: Andres Brandt MD                                 Insurance: [employer] MEDICARE;  No Hard Max;  No AUTH;  TRACK CAP  Medical Diagnosis: M25.562, G89.29 (ICD-10-CM) - Chronic pain of left knee                   Rehab Codes: M25.652, M25.662, R26.2; M25.672  Onset Date: 25                                  Next 's appt: 25     Visit# / total visits: ; Progress note for Medicare patient due at visit 19  Progress Note completed on 25 covers from 6/10/25--25.  Cancels/No Shows:

## 2025-07-19 ASSESSMENT — PATIENT HEALTH QUESTIONNAIRE - PHQ9
SUM OF ALL RESPONSES TO PHQ QUESTIONS 1-9: 0
SUM OF ALL RESPONSES TO PHQ QUESTIONS 1-9: 0
2. FEELING DOWN, DEPRESSED OR HOPELESS: NOT AT ALL
SUM OF ALL RESPONSES TO PHQ QUESTIONS 1-9: 0
SUM OF ALL RESPONSES TO PHQ9 QUESTIONS 1 & 2: 0
1. LITTLE INTEREST OR PLEASURE IN DOING THINGS: NOT AT ALL
1. LITTLE INTEREST OR PLEASURE IN DOING THINGS: NOT AT ALL
SUM OF ALL RESPONSES TO PHQ QUESTIONS 1-9: 0
2. FEELING DOWN, DEPRESSED OR HOPELESS: NOT AT ALL

## 2025-07-21 ENCOUNTER — OFFICE VISIT (OUTPATIENT)
Age: 80
End: 2025-07-21
Payer: MEDICARE

## 2025-07-21 DIAGNOSIS — R26.9 GAIT ABNORMALITY: ICD-10-CM

## 2025-07-21 DIAGNOSIS — I10 ESSENTIAL HYPERTENSION: ICD-10-CM

## 2025-07-21 DIAGNOSIS — M25.562 CHRONIC PAIN OF LEFT KNEE: Primary | ICD-10-CM

## 2025-07-21 DIAGNOSIS — G57.93 NEUROPATHY INVOLVING BOTH LOWER EXTREMITIES: ICD-10-CM

## 2025-07-21 DIAGNOSIS — N18.31 STAGE 3A CHRONIC KIDNEY DISEASE (HCC): ICD-10-CM

## 2025-07-21 DIAGNOSIS — G89.29 CHRONIC PAIN OF LEFT KNEE: Primary | ICD-10-CM

## 2025-07-21 PROCEDURE — 1160F RVW MEDS BY RX/DR IN RCRD: CPT | Performed by: FAMILY MEDICINE

## 2025-07-21 PROCEDURE — 99213 OFFICE O/P EST LOW 20 MIN: CPT | Performed by: FAMILY MEDICINE

## 2025-07-21 PROCEDURE — 1123F ACP DISCUSS/DSCN MKR DOCD: CPT | Performed by: FAMILY MEDICINE

## 2025-07-21 PROCEDURE — 1159F MED LIST DOCD IN RCRD: CPT | Performed by: FAMILY MEDICINE

## 2025-07-21 SDOH — ECONOMIC STABILITY: FOOD INSECURITY: WITHIN THE PAST 12 MONTHS, THE FOOD YOU BOUGHT JUST DIDN'T LAST AND YOU DIDN'T HAVE MONEY TO GET MORE.: NEVER TRUE

## 2025-07-21 SDOH — ECONOMIC STABILITY: FOOD INSECURITY: WITHIN THE PAST 12 MONTHS, YOU WORRIED THAT YOUR FOOD WOULD RUN OUT BEFORE YOU GOT MONEY TO BUY MORE.: NEVER TRUE

## 2025-07-22 ENCOUNTER — HOSPITAL ENCOUNTER (OUTPATIENT)
Age: 80
Setting detail: THERAPIES SERIES
Discharge: HOME | End: 2025-07-22
Payer: MEDICARE

## 2025-07-22 PROCEDURE — 97110 THERAPEUTIC EXERCISES: CPT

## 2025-07-22 NOTE — FLOWSHEET NOTE
Re-ed      []  Gait Training      []  Manual Therapy      []  Ther Activities      []  Aquatics      []  Vasocompression      []  Cervical Traction      []  Other      Total Billable time 46 min   3           Assessment: [x] Progressing toward goals   Pt presents with additional script for LE neuropathy.  She also has recent fall with pain in tailbone area.  New outcome tools or tests were performed today and results are documented as above.  Pt did perform more exercises today than last visit and denied increased pain after PT.  She did report pain in tailbone area throughout PT today.  Pt also denies completing mat table HEP as laying down for these exercises increases tailbone pain.  Pt noted some left knee soreness with standing exercises.  Added heel to toe walking for balance and this did challenge pt today.  Will continue to add more dynamic balance activities to PT.    [] No change.       [] Other:  [x] Patient would continue to benefit from skilled physical therapy services due to recent onset of left knee pain.   She also presents with limited left LE strength and flexibility.  These deficits and her pain are limiting her mobility and activity tolerance.  Skilled PT will work to improve ROM and strength deficits which should reduce her pain and improve mobility       STG: (to be met in 10 treatments) ASSESSED 7/8/25  ? Pain:  Left knee to 3/10 at worst so pt can walk with less pain  PROGRESSING  ? ROM: Left knee ROM to 0-130 degrees so pt can climb steps with less pain PROGRESSING  ? Strength: left hip to at least 4-/5 so pt can walk with less knee pain  GOAL MET  ? Function:  Pt to stand and clean home 20 minutes per day without increased left knee pain  PROGRESSING  Patient to be independent with home exercise program as demonstrated by performance with correct form without cues. PROGRESSING     LTG: (to be met in 24 treatments)  Improve WOMAC score to 7% impaired at worst so pt can walk a mile 3 days

## 2025-07-24 ENCOUNTER — HOSPITAL ENCOUNTER (OUTPATIENT)
Age: 80
Setting detail: THERAPIES SERIES
Discharge: HOME | End: 2025-07-24
Payer: MEDICARE

## 2025-07-24 PROCEDURE — 97110 THERAPEUTIC EXERCISES: CPT

## 2025-07-24 NOTE — FLOWSHEET NOTE
usual pace, then at rapid pace.  Indication of gait (Immediately after told to ‘go’.) Score: [] 0 [x] 1  Any hesitancy or multiple attempts = 0  No hesitancy = 1    Step length and height    Score: [] 0 [x] L    [x] R  Step to = 0  Step through R = 1  Step through L = 1    Foot clearance     Score: [] 0 [x] L     [x] R  Foot drop = 0  L foot clears floor = 1  R foot clears floor = 1    Step symmetry     Score: [] 0 [x] 1  Right and left step length not equal = 0  Right and left step length appear equal = 1    Step continuity     Score: [] 0 [x] 1  Stopping or discontinuity between steps = 0  Steps appear continuous = 1    Path (Excursion)     Score: [] 0 [] 1 [x] 2  Marked deviation = 0  Mild/moderate deviation or uses w. aid = 1  Straight without w. aid = 2    Trunk       Score: [] 0 [x] 1 [] 2  Marked sway or uses w. aid = 0  No sway but flex. knees or back or  uses arms for stability = 1  No sway, flex., use of arms or w. aid = 2    Walking time     Score: [x] 0 [] 1  Heels apart = 0  Heels almost touching while walking = 1    Gait Total Score     Score:    10     /12              TOTAL SCORE = BALANCE + GAIT  Score:    21 /28         Risk Indicators:   Score 18 or less = High risk of falls  Score 19-23 = Moderate risk of falls  Score 24 or more = Low risk of falls    Irwinetti ME, Patricio TF, Grayson R, Fall Risk Index for elderly patients based on number of chronic disabilities.  Am J Med 1986:80:[id number]      7/8/25 STRENGTH      Left Right Left Right   SLR 90 90 4- 4   Hip Flex           Ext 5  5 4- 4   Abd     4-  P 4   Knee Flex 130  P 130 5 5   Ext Lack 2  P Lack 2 5 5   Ankle DF 10 10 4  P 5   PF     4+ 5   Ever     4  4+   Inver     4 4+   P= Pain     Specific Instructions for next treatment:  Review response to previous visit and HEP;  Advance exercises as able to improve LE strength and ROM. See how MD follow up went.         Treatment Charges: Mins Units Time In/Out   []  Modalities        [x]

## 2025-07-28 ENCOUNTER — HOSPITAL ENCOUNTER (OUTPATIENT)
Age: 80
Setting detail: THERAPIES SERIES
Discharge: HOME | End: 2025-07-28
Payer: MEDICARE

## 2025-07-28 PROCEDURE — 97110 THERAPEUTIC EXERCISES: CPT

## 2025-07-28 NOTE — FLOWSHEET NOTE
[] [facility]  Outpatient Rehabilitation &  Therapy  [address]  [phone]  [phone] [] [facility]  Outpatient Rehabilitation &  Therapy  [address]  P: [phone]  F: [phone] [] [facility]  Outpatient Rehabilitation &  Therapy  [address]  P: [phone]  F: [phone] [] [facility]  Outpatient Rehabilitation &  Therapy  [address]  [phone]  [phone] [] [facility]  Outpatient Rehabilitation &  Therapy  [address]   P: [phone]  F: [phone]  [] [facility]  Outpatient Rehabilitation &  Therapy  [address]  P: [phone]  F: [phone] [x] [facility]  Outpatient Rehabilitation &  Therapy  [address]  P: [phone]  F: [phone] [] [facility]  Outpatient Rehabilitation &  Therapy  [address]  P: [phone]  F: [phone] [] [facility]  Outpatient Rehabilitation &  Therapy  [address]  P: [phone]  F: [phone]  [] [facility] Outpatient Rehabilitation &  Therapy  [address]  P: [phone]  F: [phone]     Physical Therapy Daily Treatment Note    Date:  2025  Patient Name:  Rut Burnham    :  1945  MRN: [id number]  Physician: Andres Brandt MD                                 Insurance: [employer] MEDICARE;  No Hard Max;  No AUTH;  TRACK CAP  Medical Diagnosis: M25.562, G89.29 (ICD-10-CM) - Chronic pain of left knee                   Rehab Codes: M25.652, M25.662, R26.2; M25.672  Onset Date: 25                                  Next 's appt: 25     Visit# / total visits: 15/24; Progress note for Medicare patient due at visit 19  Progress Note completed on 25 covers from 6/10/25--25.  Cancels/No Shows:

## 2025-07-30 ENCOUNTER — APPOINTMENT (OUTPATIENT)
Age: 80
End: 2025-07-30
Payer: MEDICARE

## 2025-08-01 ENCOUNTER — HOSPITAL ENCOUNTER (OUTPATIENT)
Age: 80
Setting detail: THERAPIES SERIES
Discharge: HOME | End: 2025-08-01
Payer: MEDICARE

## 2025-08-01 PROCEDURE — 97110 THERAPEUTIC EXERCISES: CPT

## 2025-08-01 NOTE — FLOWSHEET NOTE
[] [facility]  Outpatient Rehabilitation &  Therapy  [address]  [phone]  [phone] [] [facility]  Outpatient Rehabilitation &  Therapy  [address]  P: [phone]  F: [phone] [] [facility]  Outpatient Rehabilitation &  Therapy  [address]  P: [phone]  F: [phone] [] [facility]  Outpatient Rehabilitation &  Therapy  [address]  [phone]  [phone] [] [facility]  Outpatient Rehabilitation &  Therapy  [address]   P: [phone]  F: [phone]  [] [facility]  Outpatient Rehabilitation &  Therapy  [address]  P: [phone]  F: [phone] [x] [facility]  Outpatient Rehabilitation &  Therapy  [address]  P: [phone]  F: [phone] [] [facility]  Outpatient Rehabilitation &  Therapy  [address]  P: [phone]  F: [phone] [] [facility]  Outpatient Rehabilitation &  Therapy  [address]  P: [phone]  F: [phone]  [] [facility] Outpatient Rehabilitation &  Therapy  [address]  P: [phone]  F: [phone]     Physical Therapy Daily Treatment Note    Date:  2025  Patient Name:  Rut Burnham    :  1945  MRN: [id number]  Physician: Andres Brandt MD                                 Insurance: [employer] MEDICARE;  No Hard Max;  No AUTH;  TRACK CAP  Medical Diagnosis: M25.562, G89.29 (ICD-10-CM) - Chronic pain of left knee                   Rehab Codes: M25.652, M25.662, R26.2; M25.672  Onset Date: 25                                  Next 's appt: 25     Visit# / total visits: ; Progress note for Medicare patient due at visit 19  Progress Note completed on 25 covers from 6/10/25--25.  Cancels/No Shows:

## 2025-08-04 ENCOUNTER — HOSPITAL ENCOUNTER (OUTPATIENT)
Age: 80
Setting detail: THERAPIES SERIES
Discharge: HOME | End: 2025-08-04
Payer: MEDICARE

## 2025-08-04 PROCEDURE — 97110 THERAPEUTIC EXERCISES: CPT

## 2025-08-06 ENCOUNTER — HOSPITAL ENCOUNTER (OUTPATIENT)
Age: 80
Setting detail: THERAPIES SERIES
Discharge: HOME | End: 2025-08-06
Payer: MEDICARE

## 2025-08-06 PROBLEM — G57.93 NEUROPATHY INVOLVING BOTH LOWER EXTREMITIES: Status: ACTIVE | Noted: 2025-08-06

## 2025-08-06 PROBLEM — R26.9 GAIT ABNORMALITY: Status: ACTIVE | Noted: 2025-08-06

## 2025-08-06 PROCEDURE — 97110 THERAPEUTIC EXERCISES: CPT

## 2025-08-11 ENCOUNTER — HOSPITAL ENCOUNTER (OUTPATIENT)
Age: 80
Setting detail: THERAPIES SERIES
Discharge: HOME | End: 2025-08-11
Payer: MEDICARE

## 2025-08-11 PROCEDURE — 97110 THERAPEUTIC EXERCISES: CPT

## 2025-08-13 ENCOUNTER — HOSPITAL ENCOUNTER (OUTPATIENT)
Age: 80
Setting detail: THERAPIES SERIES
Discharge: HOME | End: 2025-08-13
Payer: MEDICARE

## 2025-08-13 PROCEDURE — 97110 THERAPEUTIC EXERCISES: CPT

## 2025-08-19 ENCOUNTER — HOSPITAL ENCOUNTER (OUTPATIENT)
Age: 80
Setting detail: THERAPIES SERIES
Discharge: HOME | End: 2025-08-19
Payer: MEDICARE

## 2025-08-19 PROCEDURE — 97110 THERAPEUTIC EXERCISES: CPT

## 2025-08-21 ENCOUNTER — HOSPITAL ENCOUNTER (OUTPATIENT)
Age: 80
Setting detail: THERAPIES SERIES
Discharge: HOME | End: 2025-08-21
Payer: MEDICARE

## 2025-08-21 PROCEDURE — 97110 THERAPEUTIC EXERCISES: CPT

## (undated) DEVICE — INSUFFLATION NEEDLE TO ESTABLISH PNEUMOPERITONEUM.: Brand: INSUFFLATION NEEDLE

## (undated) DEVICE — SUTURE VCRL + SZ 1 L36IN ABSRB UD L36MM CT-1 1/2 CIR VCP947H

## (undated) DEVICE — SUTURE MCRYL SZ 4-0 L18IN ABSRB UD L16MM PC-3 3/8 CIR PRIM Y845G

## (undated) DEVICE — TIP COVER ACCESSORY

## (undated) DEVICE — APPLICATOR LAP 45CM FLX 2 VISTASEAL

## (undated) DEVICE — APPLIER SUT CLP FOR 2-0 3-0 4-0 VCRL ABSRB SUT

## (undated) DEVICE — ADHESIVE SKIN CLOSURE TOP 36 CC HI VISC DERMBND MINI

## (undated) DEVICE — STAPLER INT L44CM 12 FIRING B FRM PWR + W/ GRIPPING SURF

## (undated) DEVICE — TRI-LUMEN FILTERED TUBE SET WITH ACTIVATED CHARCOAL FILTER: Brand: AIRSEAL

## (undated) DEVICE — TOWEL,OR,DSP,ST,NATURAL,DLX,4/PK,20PK/CS: Brand: MEDLINE

## (undated) DEVICE — COVER,LIGHT HANDLE,FLX,2/PK: Brand: MEDLINE INDUSTRIES, INC.

## (undated) DEVICE — SEALANT TISS 10 CC FIBRIN VISTASEAL

## (undated) DEVICE — ELECTRO LUBE IS A SINGLE PATIENT USE DEVICE THAT IS INTENDED TO BE USED ON ELECTROSURGICAL ELECTRODES TO REDUCE STICKING.: Brand: KEY SURGICAL ELECTRO LUBE

## (undated) DEVICE — 3M™ IOBAN™ 2 ANTIMICROBIAL INCISE DRAPE 6650EZ: Brand: IOBAN™ 2

## (undated) DEVICE — CATHETER URETH 18FR BLLN 30CC 2 W F INF CTRL BARDX

## (undated) DEVICE — SCISSOR SURG METZ CRV TIP

## (undated) DEVICE — GLOVE SURG SZ 65 THK91MIL LTX FREE SYN POLYISOPRENE

## (undated) DEVICE — AIRSEAL 12 MM ACCESS PORT AND PALM GRIP OBTURATOR WITH BLADELESS OPTICAL TIP, 120 MM LENGTH: Brand: AIRSEAL

## (undated) DEVICE — TOTAL TRAY, 16FR 10ML SIL FOLEY, URN: Brand: MEDLINE

## (undated) DEVICE — GOWN,SIRUS,NONRNF,SETINSLV,XL,20/CS: Brand: MEDLINE

## (undated) DEVICE — CLIP INT L POLYMER LOK LIG HEM O LOK

## (undated) DEVICE — BAG SPEC LAP 9X7.5 IN 12 MM 1500 CC MEM WIRE CANN NYL

## (undated) DEVICE — RELOAD STPL L45MM H1-2.6MM MESENTERY THN TISS WHT GRIPPING

## (undated) DEVICE — PACK PROCEDURE SURG ROBOTIC KID SVMMC

## (undated) DEVICE — SYRINGE, LUER LOCK, 10ML: Brand: MEDLINE

## (undated) DEVICE — APPLICATOR MEDICATED 26 CC SOLUTION HI LT ORNG CHLORAPREP

## (undated) DEVICE — SUTURE PDS II SZ 0 L27IN ABSRB VLT L36MM CT-1 1/2 CIR Z340H

## (undated) DEVICE — SUTURE NONABSORBABLE MONOFILAMENT 4-0 RB-1 36 IN BLU PROLENE 8557H

## (undated) DEVICE — INTENDED FOR TISSUE SEPARATION, AND OTHER PROCEDURES THAT REQUIRE A SHARP SURGICAL BLADE TO PUNCTURE OR CUT.: Brand: BARD-PARKER ® CARBON RIB-BACK BLADES

## (undated) DEVICE — SUTURE V-LOC 180 SZ 3-0 L6IN ABSRB GRN V-20 L26MM 1/2 CIR VLOCL0604

## (undated) DEVICE — GLOVE ORANGE PI 7 1/2   MSG9075

## (undated) DEVICE — CANNULA SEAL

## (undated) DEVICE — AGENT HEMSTAT W2XL14IN OXIDIZED REGENERATED CELOS ABSRB FOR

## (undated) DEVICE — GLOVE ORANGE PI 7   MSG9070

## (undated) DEVICE — SUTURE PERMAHAND SZ 2-0 L12X18IN NONABSORBABLE BLK SILK A185H

## (undated) DEVICE — GOWN,AURORA,NONREINFORCED,LARGE: Brand: MEDLINE

## (undated) DEVICE — SOLUTION ANTIFOG VIS SYS CLEARIFY LAPSCP

## (undated) DEVICE — DISCONTINUED USE 413865 PAD TABLE OR CONVOLUTED 72X20X2IN

## (undated) DEVICE — BLADELESS OBTURATOR: Brand: WECK VISTA

## (undated) DEVICE — PROTECTOR ULN NRV PUR FOAM HK LOOP STRP ANATOMICALLY

## (undated) DEVICE — NEEDLE HYPO 25GA L1.5IN BLU POLYPR HUB S STL REG BVL STR

## (undated) DEVICE — SUTURE VCRL SZ 1 L36IN ABSRB UD L36MM CT-1 1/2 CIR J947H

## (undated) DEVICE — COUNTER NDL 40 COUNT HLD 70 FOAM BLK ADH W/ MAG

## (undated) DEVICE — TROCAR: Brand: KII FIOS FIRST ENTRY

## (undated) DEVICE — TISSUE RETRIEVAL SYSTEM: Brand: INZII RETRIEVAL SYSTEM

## (undated) DEVICE — ARM DRAPE